# Patient Record
Sex: FEMALE | Race: WHITE | NOT HISPANIC OR LATINO | Employment: UNEMPLOYED | URBAN - METROPOLITAN AREA
[De-identification: names, ages, dates, MRNs, and addresses within clinical notes are randomized per-mention and may not be internally consistent; named-entity substitution may affect disease eponyms.]

---

## 2017-03-21 ENCOUNTER — HISTORICAL (OUTPATIENT)
Dept: FAMILY MEDICINE | Facility: CLINIC | Age: 42
End: 2017-03-21

## 2017-03-22 ENCOUNTER — HISTORICAL (OUTPATIENT)
Dept: ADMINISTRATIVE | Facility: HOSPITAL | Age: 42
End: 2017-03-22

## 2017-05-15 ENCOUNTER — HISTORICAL (OUTPATIENT)
Dept: FAMILY MEDICINE | Facility: CLINIC | Age: 42
End: 2017-05-15

## 2018-06-04 ENCOUNTER — HISTORICAL (OUTPATIENT)
Dept: RADIOLOGY | Facility: HOSPITAL | Age: 43
End: 2018-06-04

## 2018-10-01 ENCOUNTER — HISTORICAL (OUTPATIENT)
Dept: ADMINISTRATIVE | Facility: HOSPITAL | Age: 43
End: 2018-10-01

## 2018-10-03 LAB — FINAL CULTURE: NORMAL

## 2019-02-26 ENCOUNTER — HISTORICAL (OUTPATIENT)
Dept: RADIOLOGY | Facility: HOSPITAL | Age: 44
End: 2019-02-26

## 2019-04-01 ENCOUNTER — HISTORICAL (OUTPATIENT)
Dept: FAMILY MEDICINE | Facility: CLINIC | Age: 44
End: 2019-04-01

## 2019-04-01 LAB
BUN SERPL-MCNC: 10 MG/DL (ref 7–18)
CREAT SERPL-MCNC: 0.9 MG/DL (ref 0.6–1.3)

## 2019-04-18 ENCOUNTER — HISTORICAL (OUTPATIENT)
Dept: RADIOLOGY | Facility: HOSPITAL | Age: 44
End: 2019-04-18

## 2019-07-09 ENCOUNTER — HISTORICAL (OUTPATIENT)
Dept: RADIOLOGY | Facility: HOSPITAL | Age: 44
End: 2019-07-09

## 2019-07-18 ENCOUNTER — HISTORICAL (OUTPATIENT)
Dept: RADIOLOGY | Facility: HOSPITAL | Age: 44
End: 2019-07-18

## 2019-09-10 ENCOUNTER — HISTORICAL (OUTPATIENT)
Dept: RADIOLOGY | Facility: HOSPITAL | Age: 44
End: 2019-09-10

## 2019-10-16 ENCOUNTER — HISTORICAL (OUTPATIENT)
Dept: ENDOSCOPY | Facility: HOSPITAL | Age: 44
End: 2019-10-16

## 2020-01-08 ENCOUNTER — HISTORICAL (OUTPATIENT)
Dept: ADMINISTRATIVE | Facility: HOSPITAL | Age: 45
End: 2020-01-08

## 2020-01-08 LAB
ABS NEUT (OLG): 4.36 X10(3)/MCL (ref 2.1–9.2)
ALBUMIN SERPL-MCNC: 4.2 GM/DL (ref 3.4–5)
ALBUMIN/GLOB SERPL: 1 RATIO (ref 1.1–2)
ALP SERPL-CCNC: 78 UNIT/L (ref 45–117)
ALT SERPL-CCNC: 24 UNIT/L (ref 12–78)
ANISOCYTOSIS BLD QL SMEAR: NORMAL
AST SERPL-CCNC: 13 UNIT/L (ref 15–37)
BASOPHILS # BLD AUTO: 0.1 X10(3)/MCL (ref 0–0.2)
BASOPHILS NFR BLD AUTO: 1 %
BILIRUB SERPL-MCNC: 0.3 MG/DL (ref 0.2–1)
BILIRUBIN DIRECT+TOT PNL SERPL-MCNC: <0.1 MG/DL (ref 0–0.2)
BILIRUBIN DIRECT+TOT PNL SERPL-MCNC: ABNORMAL MG/DL
BUN SERPL-MCNC: 11 MG/DL (ref 7–18)
CALCIUM SERPL-MCNC: 9 MG/DL (ref 8.5–10.1)
CHLORIDE SERPL-SCNC: 108 MMOL/L (ref 98–107)
CO2 SERPL-SCNC: 22 MMOL/L (ref 21–32)
CREAT SERPL-MCNC: 0.9 MG/DL (ref 0.6–1.3)
DACRYOCYTES BLD QL SMEAR: NORMAL
EOSINOPHIL # BLD AUTO: 0.4 X10(3)/MCL (ref 0–0.9)
EOSINOPHIL NFR BLD AUTO: 5 %
ERYTHROCYTE [DISTWIDTH] IN BLOOD BY AUTOMATED COUNT: 18.6 % (ref 11.5–14.5)
GLOBULIN SER-MCNC: 4.4 GM/ML (ref 2.3–3.5)
GLUCOSE SERPL-MCNC: 94 MG/DL (ref 74–106)
HCT VFR BLD AUTO: 33.9 % (ref 35–46)
HGB BLD-MCNC: 9.4 GM/DL (ref 12–16)
HYPOCHROMIA BLD QL SMEAR: NORMAL
IMM GRANULOCYTES # BLD AUTO: 0.02 10*3/UL
IMM GRANULOCYTES NFR BLD AUTO: 0 %
LYMPHOCYTES # BLD AUTO: 1.8 X10(3)/MCL (ref 0.6–4.6)
LYMPHOCYTES NFR BLD AUTO: 26 %
MCH RBC QN AUTO: 20.6 PG (ref 26–34)
MCHC RBC AUTO-ENTMCNC: 27.7 GM/DL (ref 31–37)
MCV RBC AUTO: 74.2 FL (ref 80–100)
MICROCYTES BLD QL SMEAR: NORMAL
MONOCYTES # BLD AUTO: 0.4 X10(3)/MCL (ref 0.1–1.3)
MONOCYTES NFR BLD AUTO: 6 %
NEUTROPHILS # BLD AUTO: 4.36 X10(3)/MCL (ref 2.1–9.2)
NEUTROPHILS NFR BLD AUTO: 62 %
OVALOCYTES BLD QL SMEAR: NORMAL
PAP RECOMMENDATION EXT: NORMAL
PAP SMEAR: NORMAL
PLATELET # BLD AUTO: 361 X10(3)/MCL (ref 130–400)
PLATELET # BLD EST: ADEQUATE 10*3/UL
PMV BLD AUTO: 10.9 FL (ref 7.4–10.4)
POIKILOCYTOSIS BLD QL SMEAR: NORMAL
POLYCHROMASIA BLD QL SMEAR: NORMAL
POTASSIUM SERPL-SCNC: 3.8 MMOL/L (ref 3.5–5.1)
PROT SERPL-MCNC: 8.6 GM/DL (ref 6.4–8.2)
RBC # BLD AUTO: 4.57 X10(6)/MCL (ref 4–5.2)
RBC MORPH BLD: NORMAL
SODIUM SERPL-SCNC: 140 MMOL/L (ref 136–145)
TARGETS BLD QL SMEAR: NORMAL
TSH SERPL-ACNC: 2.22 MIU/L (ref 0.36–3.74)
WBC # SPEC AUTO: 7 X10(3)/MCL (ref 4.5–11)

## 2020-02-17 ENCOUNTER — HISTORICAL (OUTPATIENT)
Dept: INTERNAL MEDICINE | Facility: CLINIC | Age: 45
End: 2020-02-17

## 2020-02-17 LAB
ABS NEUT (OLG): 3.82 X10(3)/MCL (ref 2.1–9.2)
ALBUMIN SERPL-MCNC: 4.1 GM/DL (ref 3.4–5)
ALBUMIN/GLOB SERPL: 1 RATIO (ref 1.1–2)
ALP SERPL-CCNC: 63 UNIT/L (ref 45–117)
ALT SERPL-CCNC: 17 UNIT/L (ref 12–78)
AST SERPL-CCNC: 13 UNIT/L (ref 15–37)
BASOPHILS # BLD AUTO: 0 X10(3)/MCL (ref 0–0.2)
BASOPHILS NFR BLD AUTO: 1 %
BILIRUB SERPL-MCNC: 0.2 MG/DL (ref 0.2–1)
BILIRUBIN DIRECT+TOT PNL SERPL-MCNC: <0.1 MG/DL (ref 0–0.2)
BILIRUBIN DIRECT+TOT PNL SERPL-MCNC: ABNORMAL MG/DL
BUN SERPL-MCNC: 10 MG/DL (ref 7–18)
CALCIUM SERPL-MCNC: 9.3 MG/DL (ref 8.5–10.1)
CHLORIDE SERPL-SCNC: 110 MMOL/L (ref 98–107)
CO2 SERPL-SCNC: 24 MMOL/L (ref 21–32)
CREAT SERPL-MCNC: 0.9 MG/DL (ref 0.6–1.3)
EOSINOPHIL # BLD AUTO: 0.2 X10(3)/MCL (ref 0–0.9)
EOSINOPHIL NFR BLD AUTO: 4 %
ERYTHROCYTE [DISTWIDTH] IN BLOOD BY AUTOMATED COUNT: 19 % (ref 11.5–14.5)
GLOBULIN SER-MCNC: 4.2 GM/ML (ref 2.3–3.5)
GLUCOSE SERPL-MCNC: 84 MG/DL (ref 74–106)
HCT VFR BLD AUTO: 31.7 % (ref 35–46)
HGB BLD-MCNC: 9 GM/DL (ref 12–16)
IMM GRANULOCYTES # BLD AUTO: 0.02 10*3/UL
IMM GRANULOCYTES NFR BLD AUTO: 0 %
LYMPHOCYTES # BLD AUTO: 1.6 X10(3)/MCL (ref 0.6–4.6)
LYMPHOCYTES NFR BLD AUTO: 26 %
MCH RBC QN AUTO: 21.2 PG (ref 26–34)
MCHC RBC AUTO-ENTMCNC: 28.4 GM/DL (ref 31–37)
MCV RBC AUTO: 74.6 FL (ref 80–100)
MONOCYTES # BLD AUTO: 0.4 X10(3)/MCL (ref 0.1–1.3)
MONOCYTES NFR BLD AUTO: 7 %
NEUTROPHILS # BLD AUTO: 3.82 X10(3)/MCL (ref 2.1–9.2)
NEUTROPHILS NFR BLD AUTO: 62 %
PLATELET # BLD AUTO: 255 X10(3)/MCL (ref 130–400)
PMV BLD AUTO: 11.4 FL (ref 7.4–10.4)
POTASSIUM SERPL-SCNC: 3.4 MMOL/L (ref 3.5–5.1)
PROT SERPL-MCNC: 8.3 GM/DL (ref 6.4–8.2)
RBC # BLD AUTO: 4.25 X10(6)/MCL (ref 4–5.2)
SODIUM SERPL-SCNC: 139 MMOL/L (ref 136–145)
WBC # SPEC AUTO: 6.2 X10(3)/MCL (ref 4.5–11)

## 2020-10-16 ENCOUNTER — HISTORICAL (OUTPATIENT)
Dept: GASTROENTEROLOGY | Facility: CLINIC | Age: 45
End: 2020-10-16

## 2020-10-22 ENCOUNTER — HISTORICAL (OUTPATIENT)
Dept: ENDOSCOPY | Facility: HOSPITAL | Age: 45
End: 2020-10-22

## 2020-10-22 LAB — POC BETA-HCG (QUAL): NEGATIVE

## 2020-12-03 ENCOUNTER — HISTORICAL (OUTPATIENT)
Dept: SLEEP MEDICINE | Facility: HOSPITAL | Age: 45
End: 2020-12-03

## 2020-12-21 ENCOUNTER — HISTORICAL (OUTPATIENT)
Dept: RADIOLOGY | Facility: HOSPITAL | Age: 45
End: 2020-12-21

## 2021-01-15 ENCOUNTER — HISTORICAL (OUTPATIENT)
Dept: ADMINISTRATIVE | Facility: HOSPITAL | Age: 46
End: 2021-01-15

## 2021-01-15 LAB
ABS NEUT (OLG): 3.9 X10(3)/MCL (ref 2.1–9.2)
ALBUMIN SERPL-MCNC: 4.5 GM/DL (ref 3.5–5)
ALBUMIN/GLOB SERPL: 1.1 RATIO (ref 1.1–2)
ALP SERPL-CCNC: 61 UNIT/L (ref 40–150)
ALT SERPL-CCNC: 11 UNIT/L (ref 0–55)
AST SERPL-CCNC: 14 UNIT/L (ref 5–34)
BASOPHILS # BLD AUTO: 0 X10(3)/MCL (ref 0–0.2)
BASOPHILS NFR BLD AUTO: 1 %
BILIRUB SERPL-MCNC: 0.7 MG/DL
BILIRUBIN DIRECT+TOT PNL SERPL-MCNC: 0.3 MG/DL (ref 0–0.8)
BILIRUBIN DIRECT+TOT PNL SERPL-MCNC: 0.4 MG/DL (ref 0–0.5)
BUN SERPL-MCNC: 10 MG/DL (ref 7–18.7)
CALCIUM SERPL-MCNC: 9.5 MG/DL (ref 8.4–10.2)
CHLORIDE SERPL-SCNC: 107 MMOL/L (ref 98–107)
CHOLEST SERPL-MCNC: 149 MG/DL
CHOLEST/HDLC SERPL: 2 {RATIO} (ref 0–5)
CO2 SERPL-SCNC: 22 MMOL/L (ref 22–29)
CREAT SERPL-MCNC: 0.94 MG/DL (ref 0.55–1.02)
DEPRECATED CALCIDIOL+CALCIFEROL SERPL-MC: 40.7 NG/ML (ref 30–80)
EOSINOPHIL # BLD AUTO: 0 X10(3)/MCL (ref 0–0.9)
EOSINOPHIL NFR BLD AUTO: 0 %
ERYTHROCYTE [DISTWIDTH] IN BLOOD BY AUTOMATED COUNT: 17.4 % (ref 11.5–14.5)
EST CREAT CLEARANCE SER (OHS): 71.14 ML/MIN
EST. AVERAGE GLUCOSE BLD GHB EST-MCNC: 116.9 MG/DL
GLOBULIN SER-MCNC: 4.1 GM/DL (ref 2.4–3.5)
GLUCOSE SERPL-MCNC: 84 MG/DL (ref 74–100)
HBA1C MFR BLD: 5.7 %
HCT VFR BLD AUTO: 31.9 % (ref 35–46)
HDLC SERPL-MCNC: 62 MG/DL (ref 35–60)
HGB BLD-MCNC: 8.8 GM/DL (ref 12–16)
IMM GRANULOCYTES # BLD AUTO: 0.01 10*3/UL
IMM GRANULOCYTES NFR BLD AUTO: 0 %
LDLC SERPL CALC-MCNC: 68 MG/DL (ref 50–140)
LYMPHOCYTES # BLD AUTO: 1.5 X10(3)/MCL (ref 0.6–4.6)
LYMPHOCYTES NFR BLD AUTO: 26 %
MCH RBC QN AUTO: 20.1 PG (ref 26–34)
MCHC RBC AUTO-ENTMCNC: 27.6 GM/DL (ref 31–37)
MCV RBC AUTO: 73 FL (ref 80–100)
MONOCYTES # BLD AUTO: 0.3 X10(3)/MCL (ref 0.1–1.3)
MONOCYTES NFR BLD AUTO: 6 %
NEUTROPHILS # BLD AUTO: 3.9 X10(3)/MCL (ref 2.1–9.2)
NEUTROPHILS NFR BLD AUTO: 67 %
PLATELET # BLD AUTO: 310 X10(3)/MCL (ref 130–400)
PMV BLD AUTO: 11.3 FL (ref 7.4–10.4)
POTASSIUM SERPL-SCNC: 4 MMOL/L (ref 3.5–5.1)
PROT SERPL-MCNC: 8.6 GM/DL (ref 6.4–8.3)
RBC # BLD AUTO: 4.37 X10(6)/MCL (ref 4–5.2)
SODIUM SERPL-SCNC: 138 MMOL/L (ref 136–145)
TRIGL SERPL-MCNC: 95 MG/DL (ref 37–140)
TSH SERPL-ACNC: 2.03 UIU/ML (ref 0.35–4.94)
VIT B12 SERPL-MCNC: 448 PG/ML (ref 213–816)
VLDLC SERPL CALC-MCNC: 19 MG/DL
WBC # SPEC AUTO: 5.8 X10(3)/MCL (ref 4.5–11)

## 2021-02-22 LAB — POC BETA-HCG (QUAL): NEGATIVE

## 2021-05-21 LAB — POC BETA-HCG (QUAL): NEGATIVE

## 2021-08-04 ENCOUNTER — HISTORICAL (OUTPATIENT)
Dept: ADMINISTRATIVE | Facility: HOSPITAL | Age: 46
End: 2021-08-04

## 2021-08-04 LAB
FERRITIN SERPL-MCNC: 14.16 NG/ML (ref 4.63–204)
IRON SATN MFR SERPL: 27 % (ref 20–50)
IRON SATN MFR SERPL: 29 % (ref 20–50)
IRON SERPL-MCNC: 90 UG/DL (ref 50–170)
IRON SERPL-MCNC: 93 UG/DL (ref 50–170)
TIBC SERPL-MCNC: 227 UG/DL (ref 70–310)
TIBC SERPL-MCNC: 239 UG/DL (ref 70–310)
TIBC SERPL-MCNC: 320 UG/DL (ref 250–450)
TIBC SERPL-MCNC: 329 UG/DL (ref 250–450)
TRANSFERRIN SERPL-MCNC: 295 MG/DL (ref 180–382)
TRANSFERRIN SERPL-MCNC: 300 MG/DL (ref 180–382)

## 2021-12-21 ENCOUNTER — HISTORICAL (OUTPATIENT)
Dept: FAMILY MEDICINE | Facility: CLINIC | Age: 46
End: 2021-12-21

## 2021-12-21 LAB
ABS NEUT (OLG): 2.3 X10(3)/MCL (ref 2.1–9.2)
BASOPHILS # BLD AUTO: 0 X10(3)/MCL (ref 0–0.2)
BASOPHILS NFR BLD AUTO: 1 %
EOSINOPHIL # BLD AUTO: 0.1 X10(3)/MCL (ref 0–0.9)
EOSINOPHIL NFR BLD AUTO: 3 %
ERYTHROCYTE [DISTWIDTH] IN BLOOD BY AUTOMATED COUNT: 13.2 % (ref 11.5–14.5)
HCT VFR BLD AUTO: 41.8 % (ref 35–46)
HGB BLD-MCNC: 13.7 GM/DL (ref 12–16)
IMM GRANULOCYTES # BLD AUTO: 0.01 10*3/UL
IMM GRANULOCYTES NFR BLD AUTO: 0 %
LYMPHOCYTES # BLD AUTO: 1.8 X10(3)/MCL (ref 0.6–4.6)
LYMPHOCYTES NFR BLD AUTO: 39 %
MCH RBC QN AUTO: 29.6 PG (ref 26–34)
MCHC RBC AUTO-ENTMCNC: 32.8 GM/DL (ref 31–37)
MCV RBC AUTO: 90.3 FL (ref 80–100)
MONOCYTES # BLD AUTO: 0.3 X10(3)/MCL (ref 0.1–1.3)
MONOCYTES NFR BLD AUTO: 6 %
NEUTROPHILS # BLD AUTO: 2.3 X10(3)/MCL (ref 2.1–9.2)
NEUTROPHILS NFR BLD AUTO: 51 %
NRBC BLD AUTO-RTO: 0 % (ref 0–0.2)
PLATELET # BLD AUTO: 205 X10(3)/MCL (ref 130–400)
PMV BLD AUTO: 11.1 FL (ref 7.4–10.4)
RBC # BLD AUTO: 4.63 X10(6)/MCL (ref 4–5.2)
WBC # SPEC AUTO: 4.5 X10(3)/MCL (ref 4.5–11)

## 2022-03-18 ENCOUNTER — HISTORICAL (OUTPATIENT)
Dept: RADIOLOGY | Facility: HOSPITAL | Age: 47
End: 2022-03-18

## 2022-03-18 ENCOUNTER — HISTORICAL (OUTPATIENT)
Dept: ADMINISTRATIVE | Facility: HOSPITAL | Age: 47
End: 2022-03-18

## 2022-04-10 ENCOUNTER — HISTORICAL (OUTPATIENT)
Dept: ADMINISTRATIVE | Facility: HOSPITAL | Age: 47
End: 2022-04-10
Payer: MEDICARE

## 2022-04-25 VITALS
SYSTOLIC BLOOD PRESSURE: 129 MMHG | WEIGHT: 184.06 LBS | HEIGHT: 66 IN | OXYGEN SATURATION: 100 % | BODY MASS INDEX: 29.58 KG/M2 | DIASTOLIC BLOOD PRESSURE: 83 MMHG

## 2022-05-12 DIAGNOSIS — E04.1 THYROID NODULE: Primary | ICD-10-CM

## 2022-05-23 ENCOUNTER — TELEPHONE (OUTPATIENT)
Dept: FAMILY MEDICINE | Facility: CLINIC | Age: 47
End: 2022-05-23
Payer: MEDICARE

## 2022-05-23 DIAGNOSIS — G89.29 CHRONIC NONINTRACTABLE HEADACHE, UNSPECIFIED HEADACHE TYPE: ICD-10-CM

## 2022-05-23 DIAGNOSIS — T78.40XA ALLERGY, INITIAL ENCOUNTER: Primary | ICD-10-CM

## 2022-05-23 DIAGNOSIS — R51.9 CHRONIC NONINTRACTABLE HEADACHE, UNSPECIFIED HEADACHE TYPE: ICD-10-CM

## 2022-05-23 DIAGNOSIS — G43.711 CHRONIC MIGRAINE WITHOUT AURA, INTRACTABLE, WITH STATUS MIGRAINOSUS: ICD-10-CM

## 2022-05-23 RX ORDER — TOPIRAMATE 100 MG/1
100 TABLET, FILM COATED ORAL 2 TIMES DAILY
Qty: 60 TABLET | Refills: 3 | Status: SHIPPED | OUTPATIENT
Start: 2022-05-23 | End: 2022-06-22

## 2022-05-23 RX ORDER — CETIRIZINE HYDROCHLORIDE 10 MG/1
10 TABLET ORAL DAILY
Qty: 30 TABLET | Refills: 11 | Status: SHIPPED | OUTPATIENT
Start: 2022-05-23 | End: 2022-06-17 | Stop reason: SDUPTHER

## 2022-05-23 RX ORDER — MONTELUKAST SODIUM 10 MG/1
10 TABLET ORAL NIGHTLY
Qty: 30 TABLET | Refills: 3 | Status: SHIPPED | OUTPATIENT
Start: 2022-05-23 | End: 2022-06-17 | Stop reason: SDUPTHER

## 2022-05-23 RX ORDER — GABAPENTIN 300 MG/1
300 CAPSULE ORAL 2 TIMES DAILY
Qty: 60 CAPSULE | Refills: 3 | Status: SHIPPED | OUTPATIENT
Start: 2022-05-23 | End: 2022-06-17 | Stop reason: SDUPTHER

## 2022-05-23 NOTE — TELEPHONE ENCOUNTER
----- Message from Aaliyah Epps MA sent at 5/23/2022  9:22 AM CDT -----  Regarding: refills  R/S her appointment today. She needs refills on :  Ceterizine  Montelukast  Toprimate  Gabpentin  Pharmacy [facility]

## 2022-05-24 ENCOUNTER — HOSPITAL ENCOUNTER (OUTPATIENT)
Dept: RADIOLOGY | Facility: HOSPITAL | Age: 47
Discharge: HOME | End: 2022-05-24
Attending: STUDENT IN AN ORGANIZED HEALTH CARE EDUCATION/TRAINING PROGRAM
Payer: MEDICAID

## 2022-05-24 DIAGNOSIS — E04.1 THYROID NODULE: ICD-10-CM

## 2022-05-24 PROCEDURE — 76536 US EXAM OF HEAD AND NECK: CPT | Mod: TC

## 2022-05-25 ENCOUNTER — TELEPHONE (OUTPATIENT)
Dept: FAMILY MEDICINE | Facility: CLINIC | Age: 47
End: 2022-05-25
Payer: MEDICAID

## 2022-05-25 DIAGNOSIS — E04.2 MULTIPLE THYROID NODULES: Primary | ICD-10-CM

## 2022-05-25 NOTE — TELEPHONE ENCOUNTER
Called the patient today at 11:52 AM regarding her thyroid ultrasound results.  After confirming the date of birth I released the results.  Even though the ultrasound report says multiple subcentimeter nodules in the right side did not meet the criteria for biopsy but will refer the patient to ENT for further recommendations.  Patient is currently asymptomatic.  Patient voiced understanding.  Referral done.        Dianne JOHN [facility]

## 2022-06-17 ENCOUNTER — OFFICE VISIT (OUTPATIENT)
Dept: FAMILY MEDICINE | Facility: CLINIC | Age: 47
End: 2022-06-17
Payer: MEDICAID

## 2022-06-17 VITALS
BODY MASS INDEX: 31.55 KG/M2 | WEIGHT: 189.38 LBS | HEIGHT: 65 IN | SYSTOLIC BLOOD PRESSURE: 131 MMHG | RESPIRATION RATE: 18 BRPM | OXYGEN SATURATION: 100 % | TEMPERATURE: 98 F | HEART RATE: 80 BPM | DIASTOLIC BLOOD PRESSURE: 85 MMHG

## 2022-06-17 DIAGNOSIS — R51.9 CHRONIC DAILY HEADACHE: Primary | ICD-10-CM

## 2022-06-17 DIAGNOSIS — E04.1 THYROID NODULE: ICD-10-CM

## 2022-06-17 DIAGNOSIS — F32.A DEPRESSION, UNSPECIFIED DEPRESSION TYPE: ICD-10-CM

## 2022-06-17 PROCEDURE — 99213 OFFICE O/P EST LOW 20 MIN: CPT | Mod: PBBFAC

## 2022-06-17 RX ORDER — CETIRIZINE HYDROCHLORIDE 10 MG/1
10 TABLET ORAL DAILY
Qty: 30 TABLET | Refills: 11 | Status: SHIPPED | OUTPATIENT
Start: 2022-06-17 | End: 2022-11-29 | Stop reason: SDUPTHER

## 2022-06-17 RX ORDER — MONTELUKAST SODIUM 10 MG/1
10 TABLET ORAL NIGHTLY
Qty: 30 TABLET | Refills: 3 | Status: SHIPPED | OUTPATIENT
Start: 2022-06-17 | End: 2022-07-17

## 2022-06-17 RX ORDER — GABAPENTIN 300 MG/1
300 CAPSULE ORAL 2 TIMES DAILY
Qty: 60 CAPSULE | Refills: 0 | Status: SHIPPED | OUTPATIENT
Start: 2022-06-17 | End: 2022-06-24

## 2022-06-17 NOTE — PROGRESS NOTES
Chief Complaint  chronic headache  and Medication Refill      History of Present Illness  Hawa Ortiz is a 47 y.o. year old female presents for the following    Acute issues  None.  Patient reports that she has been feeling great and has no concerns.    Chronic Issues  Depression- She is currently following [facility]. Currently on Latuda 40 mg every day. Denies of any suicidal or homicidal symptoms. PHQ-9 score- 6    Chronic daily headaches and migraine - well controlled, following neurology at [facility]. Currently on TPM  and Neurontin     History of bilateral cystic thyroid nodule-asymptomatic. Denies of any dysphagia or hoarseness of voice. Ultrasound in September 2019 which demonstrated stable bilateral cystic thyroid foci with no developmental of solid or concerning nodule. Last TSH wnl. Most recent US was on 5/12/22-  Some increase in size of a cyst in the left hemithyroid with some solid components within.Multiple subcentimeter nodules in the right do not meet criteria for biopsy. ENT referral done, has an appointments on 6/21/22.      Health Management  pap smear- 2020 NIL  LMP- 4/11 , on Depo  Screening colonoscopy- with Dr. Dunham in July/2022    Review of Systems  -fever, -chills, -fatigue  -chest pain, -SOB  -n/v/d/c, -abdominal pain    Physical Exam  General: Patient is sitting in the chair, not in acute distress  HEENT- mild thyromegaly left side, non tender  CVS: RRR, no MRG, no peripheral edema  Chest: CTA bilaterally  Abdomen: soft, nontender, normal bowel sounds heard  Psych: calm and cooperative    Vitals:    06/17/22 0951   BP: 131/85   Pulse: 80   Resp: 18   Temp: 98.4 °F (36.9 °C)     Wt Readings from Last 2 Encounters:   06/17/22 85.9 kg (189 lb 6 oz)   01/21/22 83.5 kg (184 lb 1.4 oz)         Assessment / Plan:  Chronic daily headache  Recommend to continue Neurontin and Topamax as prescribed by Neurology  Recommend to keep scheduled appointment with Neurology in  October    Depression, unspecified depression type  Well controlled with Latuda  Keep scheduled appointment with [facility]    Thyroid nodule  Last TSH WNL  Keep scheduled appointment with ENT   Follow-up in 3 months with colonoscopy results     Other orders  -     cetirizine (ZYRTEC) 10 MG tablet; Take 1 tablet (10 mg total) by mouth once daily.  Dispense: 30 tablet; Refill: 11  -     gabapentin (NEURONTIN) 300 MG capsule; Take 1 capsule (300 mg total) by mouth 2 (two) times daily.  Dispense: 60 capsule; Refill: 0  -     montelukast (SINGULAIR) 10 mg tablet; Take 1 tablet (10 mg total) by mouth every evening.  Dispense: 30 tablet; Refill: 3     Dianne Larios MD  [facility]    Portions of the record may have been created with voice recognition software. Occasional wrong-word or sound-a-like substitutions may have occurred due to the inherent limitations of voice recognition software. Read the chart carefully and recognize, using context, where substitutions have occurred.

## 2022-06-21 ENCOUNTER — OFFICE VISIT (OUTPATIENT)
Dept: OTOLARYNGOLOGY | Facility: CLINIC | Age: 47
End: 2022-06-21
Payer: MEDICAID

## 2022-06-21 VITALS
HEIGHT: 66 IN | DIASTOLIC BLOOD PRESSURE: 80 MMHG | SYSTOLIC BLOOD PRESSURE: 114 MMHG | HEART RATE: 72 BPM | TEMPERATURE: 98 F | BODY MASS INDEX: 29.76 KG/M2 | WEIGHT: 185.19 LBS

## 2022-06-21 DIAGNOSIS — J30.9 ALLERGIC RHINITIS, UNSPECIFIED SEASONALITY, UNSPECIFIED TRIGGER: ICD-10-CM

## 2022-06-21 DIAGNOSIS — R13.10 DYSPHAGIA, UNSPECIFIED TYPE: Primary | ICD-10-CM

## 2022-06-21 DIAGNOSIS — K21.9 LPRD (LARYNGOPHARYNGEAL REFLUX DISEASE): ICD-10-CM

## 2022-06-21 PROCEDURE — 99213 OFFICE O/P EST LOW 20 MIN: CPT | Mod: PBBFAC

## 2022-06-21 PROCEDURE — 31575 DIAGNOSTIC LARYNGOSCOPY: CPT | Mod: PBBFAC | Performed by: STUDENT IN AN ORGANIZED HEALTH CARE EDUCATION/TRAINING PROGRAM

## 2022-06-21 RX ORDER — AZELASTINE 1 MG/ML
1 SPRAY, METERED NASAL 2 TIMES DAILY
Qty: 30 ML | Refills: 5 | Status: SHIPPED | OUTPATIENT
Start: 2022-06-21 | End: 2022-11-29 | Stop reason: SDUPTHER

## 2022-06-21 RX ORDER — LIDOCAINE HYDROCHLORIDE 40 MG/ML
2 INJECTION, SOLUTION RETROBULBAR
Status: DISCONTINUED | OUTPATIENT
Start: 2022-06-21 | End: 2022-11-17

## 2022-06-21 RX ORDER — FLUTICASONE PROPIONATE 50 MCG
1 SPRAY, SUSPENSION (ML) NASAL 2 TIMES DAILY
Qty: 15.8 ML | Refills: 12 | Status: SHIPPED | OUTPATIENT
Start: 2022-06-21 | End: 2022-11-29 | Stop reason: SDUPTHER

## 2022-06-21 NOTE — PROGRESS NOTES
[facility]  Otolaryngology Clinic Note    Hawa Ortiz  Encounter Date: 6/21/2022  YOB: 1975  Physician: Walter Aguirre MD    Chief Complaint: dysphagia    HPI: Hawa Ortiz is a 47 y.o. female sent by her primary care physician for persistent dysphagia, sore throat particularly to water, difficulty with swallowing meats, intermittent hoarseness, postnasal drip, itchy eyes and nose, anterior rhinorrhea, sneezing, anterior neck discomfort.  She reports this has been as long as she can recall.  She had a thyroid ultrasound which had demonstrated thyroid nodules not meeting FNA criteria.  Her throat discomfort and nonproductive cough are worse at night prior to going to bed.  She frequently feels like solids gets stuck in her throat and require great effort to push down.    ROS:   General: Negative except per HPI  Skin: Denies rash, ulcer, or lesion.  Eyes: Denies vision changes or diplopia.  Ears: Negative except per HPI  Nose: Negative except per HPI  Throat/mouth: Negative except per HPI  Cardiovascular: Negative except per HPI  Respiratory: Negative except per HPI  Neck: Negative except per HPI  Endocrine: Negative except per HPI  Neurologic: Negative except per HPI    Other 10-point review of systems negative except per HPI      Review of patient's allergies indicates:  No Known Allergies    History reviewed. No pertinent past medical history.    Past Surgical History:   Procedure Laterality Date    CHOLECYSTECTOMY      EYE SURGERY Bilateral        Social History     Socioeconomic History    Marital status: Single   Tobacco Use    Smoking status: Never Smoker    Smokeless tobacco: Never Used   Substance and Sexual Activity    Alcohol use: Never    Drug use: Never    Sexual activity: Not Currently       History reviewed. No pertinent family history.    Outpatient Encounter Medications as of 6/21/2022   Medication Sig Dispense Refill    cetirizine (ZYRTEC) 10 MG  "tablet Take 1 tablet (10 mg total) by mouth once daily. 30 tablet 11    gabapentin (NEURONTIN) 300 MG capsule Take 1 capsule (300 mg total) by mouth 2 (two) times daily. 60 capsule 0    montelukast (SINGULAIR) 10 mg tablet Take 1 tablet (10 mg total) by mouth every evening. 30 tablet 3    topiramate (TOPAMAX) 100 MG tablet TAKE 1 TABLET BY MOUTH TWICE A  tablet 1    topiramate (TOPAMAX) 100 MG tablet Take 1 tablet (100 mg total) by mouth 2 (two) times daily. 60 tablet 3     No facility-administered encounter medications on file as of 6/21/2022.       Physical Exam:  Vitals:    06/21/22 1417   BP: 114/80   BP Location: Right arm   Patient Position: Sitting   Pulse: 72   Temp: 98.2 °F (36.8 °C)   TempSrc: Oral   Weight: 84 kg (185 lb 3.2 oz)   Height: 5' 6" (1.676 m)       Constitutional  General Appearance: well nourished, well-developed, AAO x3, NAD  HEENT  Eyes: PEERLA, EOMI, normal conjunctivae  Ears: Hearing well at conversation level   AD: auricle normal, EAC normal, TM intact, no RAFA   AS: auricle normal, EAC normal, TM intact, no RAFA   Vestibular: ambulates without gait disturbance  Nose: septum midline, no inferior turbinate hypertrophy, no polyps, moist mucosa without erythema or blue hue  OC/OP: dentition poor, no oral lesions, tongue/FOM/BOT- soft, no leukoplakia/ulcerations/ tenderness  Nasopharynx, Hypopharynx, and Larynx:    Indirect: attempted, limited view due to patient intolerance  Neck: soft, non-tender, no palpable lymph nodes   Thyroid region- no nodules or goiter  Neuro: CN II - XII intact bilaterally  Cardiovascular: peripheral pulses palpable  Respiratory: non-labored respirations  Psychiatric: oriented to time, place and person, no depression, anxiety or agitation    Procedures:Flexible Fiberoptic Laryngoscopy/Nasopharyngoscopy via left nare    Procedure in Detail: Informed consent was obtained from the patient after explanation of procedure, indications, risks and benefits. " Flexible endoscopy was performed through the nasal passages. The nasal cavity, nasopharynx, oropharynx, hypopharynx and larynx were adequately visualized. The true vocal cords and arytenoids were examined during phonation and repose.    Anesthesia: Topical Neosynephrine / Tetracaine  Adverse Events: None  Resident Surgeon: Walter Aguirre MD   Blood loss: none  Condition: good    Findings:  NP/OP: no masses/lesions of NC, eustachian tube, fossa of Rosenmuller, no adenoid hypertrophy  BOT/vallecula: no lingual hypertrophy, no masses/lesions, no secretions obscuring visualization  Piriform sinuses/post-cricoid: no masses/lesions, no pooling of secretions  Epiglottis: lingual and laryngeal surfaces within normal limits  Arytenoids/FVFs: no masses/lesions, no edema, bilateral mobility  TVCs: bilateral cord mobility, no masses or lesions  No aspiration, no pooled secretions  No sign of malignancy      Pertinent Data:  ? LABS:  ? AUDIO:  ? CT Scan:    Imaging:   I personally reviewed the following images:    Summary of Outside Records:      Assessment/Plan:  Hawa Ortiz is a 47 y.o. female with AR, likely LPRD/GERD, solid dysphagia, differential including eosinophilic esophagitis. Has subcentimeter right thyroid nodules, nearly 2 cm cystic nodule of left thyroid    - Allergy test  - Saline, Flonase, Astelin  - Referal to GI for solid dysphagia (mostly meat)  - Observe thyroid cystic nodule for now, repeat thyroid US in 6 months  - RTC 6 weeks    Walter Aguirre MD  [facility] Department of Otolaryngology  [facility]

## 2022-08-25 ENCOUNTER — OFFICE VISIT (OUTPATIENT)
Dept: OTOLARYNGOLOGY | Facility: CLINIC | Age: 47
End: 2022-08-25
Payer: MEDICAID

## 2022-08-25 VITALS
BODY MASS INDEX: 29.57 KG/M2 | DIASTOLIC BLOOD PRESSURE: 71 MMHG | RESPIRATION RATE: 16 BRPM | WEIGHT: 184 LBS | TEMPERATURE: 98 F | HEIGHT: 66 IN | SYSTOLIC BLOOD PRESSURE: 105 MMHG | HEART RATE: 65 BPM

## 2022-08-25 DIAGNOSIS — K21.9 LPRD (LARYNGOPHARYNGEAL REFLUX DISEASE): Primary | ICD-10-CM

## 2022-08-25 PROCEDURE — 99213 OFFICE O/P EST LOW 20 MIN: CPT | Mod: PBBFAC

## 2022-08-25 RX ORDER — FAMOTIDINE 20 MG/1
20 TABLET, FILM COATED ORAL DAILY PRN
Qty: 30 TABLET | Refills: 6 | Status: SHIPPED | OUTPATIENT
Start: 2022-08-25 | End: 2023-08-15 | Stop reason: SDUPTHER

## 2022-08-25 RX ORDER — PANTOPRAZOLE SODIUM 40 MG/1
40 TABLET, DELAYED RELEASE ORAL DAILY
Qty: 30 TABLET | Refills: 11 | Status: SHIPPED | OUTPATIENT
Start: 2022-08-25 | End: 2022-11-29 | Stop reason: SDUPTHER

## 2022-08-25 NOTE — PROGRESS NOTES
The scope used for the exam was:  Flexible scope ENF-P4  Serial Number:  1)    [id number]    []   2)    [id number]    []   3)    [id number]    []   4)    [id number]    []   5)    [id number]    []   6)    [id number]    []       The scope used for the exam was:  Rigid scope   Serial Number:  1)   [id number]    []   2)   [id number]    []   3)   7330    []   4)    3384   []   5)    0824   []   6)    5554   []     7)   7425    []   8)   2240    []   9)   1109    []

## 2022-08-25 NOTE — PROGRESS NOTES
[facility]  Otolaryngology Clinic Note    Hawa Ortiz  Encounter Date: 8/25/2022  YOB: 1975  Physician: Walter Aguirre MD    Chief Complaint: dysphagia    HPI: Hawa Ortiz is a 47 y.o. female sent by her primary care physician for persistent dysphagia, sore throat particularly to water, difficulty with swallowing meats, intermittent hoarseness, postnasal drip, itchy eyes and nose, anterior rhinorrhea, sneezing, anterior neck discomfort.  She reports this has been as long as she can recall.  She had a thyroid ultrasound which had demonstrated thyroid nodules not meeting FNA criteria.  Her throat discomfort and nonproductive cough are worse at night prior to going to bed.  She frequently feels like solids gets stuck in her throat and require great effort to push down.    8/25/22: Here today for follow up. Continues to endorse throat pain that is worse at night.  She states she does have reflux symptoms including burning chest pain and acid taste in her mouth. Occasionally loses her voice.  She does not take any medication for this. We discussed foods to avoid however she states where she lives, other people buy the food and she has no control over it. Unfortunately she eats a lot of friend chicken and pizza.  Has been using flonase and astelin but does not feel this has helped much with her nasal symptoms.     ROS:   General: Negative except per HPI  Skin: Denies rash, ulcer, or lesion.  Eyes: Denies vision changes or diplopia.  Ears: Negative except per HPI  Nose: Negative except per HPI  Throat/mouth: Negative except per HPI  Cardiovascular: Negative except per HPI  Respiratory: Negative except per HPI  Neck: Negative except per HPI  Endocrine: Negative except per HPI  Neurologic: Negative except per HPI    Other 10-point review of systems negative except per HPI      Review of patient's allergies indicates:  No Known Allergies    History reviewed. No pertinent past  "medical history.    Past Surgical History:   Procedure Laterality Date    CHOLECYSTECTOMY      EYE SURGERY Bilateral        Social History     Socioeconomic History    Marital status: Single   Tobacco Use    Smoking status: Never Smoker    Smokeless tobacco: Never Used   Substance and Sexual Activity    Alcohol use: Never    Drug use: Never    Sexual activity: Not Currently       History reviewed. No pertinent family history.    Outpatient Encounter Medications as of 8/25/2022   Medication Sig Dispense Refill    azelastine (ASTELIN) 137 mcg (0.1 %) nasal spray 1 spray (137 mcg total) by Nasal route 2 (two) times daily. 30 mL 5    fluticasone propionate (FLONASE) 50 mcg/actuation nasal spray 1 spray (50 mcg total) by Each Nostril route 2 (two) times a day. 15.8 mL 12    gabapentin (NEURONTIN) 300 MG capsule TAKE 1 CAPSULE Y MOUTH AT BEDTIME FOR 30 DAYS 30 capsule 4    sodium chloride (SALINE MIST) 0.65 % nasal spray 1 spray by Nasal route 2 (two) times a day. 15 mL 12    topiramate (TOPAMAX) 100 MG tablet TAKE 1 TABLET BY MOUTH TWICE A  tablet 1    cetirizine (ZYRTEC) 10 MG tablet Take 1 tablet (10 mg total) by mouth once daily. 30 tablet 11     Facility-Administered Encounter Medications as of 8/25/2022   Medication Dose Route Frequency Provider Last Rate Last Admin    LIDOcaine (PF) 40 mg/mL (4 %) injection 2 mL  2 mL Other 1 time in Clinic/HOD Walter Aguirre MD        phenylephrine HCL 1% nasal spray 1 spray  1 spray Each Nostril 1 time in Clinic/HOD Walter Aguirre MD           Physical Exam:  Vitals:    08/25/22 1223   BP: 105/71   BP Location: Left arm   Patient Position: Sitting   BP Method: Medium (Automatic)   Pulse: 65   Resp: 16   Temp: 98 °F (36.7 °C)   TempSrc: Oral   Weight: 83.5 kg (184 lb)   Height: 5' 6" (1.676 m)       Constitutional  General Appearance: well nourished, well-developed, AAO x3, NAD  HEENT  Eyes: PEERLA, EOMI, normal conjunctivae  Ears: Hearing well at " conversation level   AD: auricle normal, EAC normal, TM intact, no RAFA   AS: auricle normal, EAC normal, TM intact, no RAFA   Vestibular: ambulates without gait disturbance  Nose: septum midline, no inferior turbinate hypertrophy, no polyps, moist mucosa  OC/OP: dentition poor, no oral lesions, tongue/FOM/BOT- soft, no leukoplakia/ulcerations/ tenderness  Neck: soft, non-tender, no palpable lymph nodes   Thyroid region- no nodules or goiter  Neuro: CN II - XII intact bilaterally  Cardiovascular: peripheral pulses palpable  Respiratory: non-labored respirations  Psychiatric: oriented to time, place and person, no depression, anxiety or agitation    Pertinent Data:  ? LABS:  ? AUDIO:  ? CT Scan:    Imaging:   I personally reviewed the following images:    Summary of Outside Records:      Assessment/Plan:  Hawa Ortiz is a 47 y.o. female with LPRD/GERD, allergic rhinitis. Has subcentimeter right thyroid nodules, nearly 2 cm cystic nodule of left thyroid.    - RAST testing ordered at last visit, has not been completed   - Continue flonase and astelin. Went over correct way to administer medications.   - Will send script for protonix to take every morning. Since she is unable to control the foods she gets where she lives, I will also send a script for pepcid to be used as needed at night if she has worsening of symptoms.   - Observe thyroid cystic nodule for now, repeat thyroid US in 6 months  - RTC  3 months

## 2022-09-07 NOTE — PROGRESS NOTES
I have reviewed the notes, assessments, and/or procedures performed by resident, I concur with her/his documentation of Hawa Ortiz.

## 2022-09-13 ENCOUNTER — OFFICE VISIT (OUTPATIENT)
Dept: FAMILY MEDICINE | Facility: CLINIC | Age: 47
End: 2022-09-13
Payer: MEDICAID

## 2022-09-13 VITALS
DIASTOLIC BLOOD PRESSURE: 63 MMHG | WEIGHT: 185.81 LBS | SYSTOLIC BLOOD PRESSURE: 112 MMHG | HEIGHT: 66 IN | OXYGEN SATURATION: 100 % | HEART RATE: 61 BPM | BODY MASS INDEX: 29.86 KG/M2 | RESPIRATION RATE: 18 BRPM

## 2022-09-13 DIAGNOSIS — Z13.220 LIPID SCREENING: ICD-10-CM

## 2022-09-13 DIAGNOSIS — F32.A DEPRESSION, UNSPECIFIED DEPRESSION TYPE: Primary | ICD-10-CM

## 2022-09-13 DIAGNOSIS — Z11.4 ENCOUNTER FOR SCREENING FOR HIV: ICD-10-CM

## 2022-09-13 DIAGNOSIS — M54.50 LUMBAR PAIN: ICD-10-CM

## 2022-09-13 DIAGNOSIS — M54.10 RADICULOPATHY, UNSPECIFIED SPINAL REGION: ICD-10-CM

## 2022-09-13 DIAGNOSIS — Z11.59 ENCOUNTER FOR HEPATITIS C SCREENING TEST FOR LOW RISK PATIENT: ICD-10-CM

## 2022-09-13 DIAGNOSIS — Z13.1 DIABETES MELLITUS SCREENING: ICD-10-CM

## 2022-09-13 PROCEDURE — 99214 OFFICE O/P EST MOD 30 MIN: CPT | Mod: PBBFAC | Performed by: NURSE PRACTITIONER

## 2022-09-13 RX ORDER — METHOCARBAMOL 750 MG/1
750 TABLET, FILM COATED ORAL NIGHTLY PRN
Qty: 7 TABLET | Refills: 0 | Status: SHIPPED | OUTPATIENT
Start: 2022-09-13 | End: 2022-09-20

## 2022-09-13 RX ORDER — ESCITALOPRAM OXALATE 10 MG/1
10 TABLET ORAL DAILY
Qty: 30 TABLET | Refills: 1 | Status: SHIPPED | OUTPATIENT
Start: 2022-09-13 | End: 2022-10-17

## 2022-09-13 RX ORDER — TRAMADOL HYDROCHLORIDE 50 MG/1
50 TABLET ORAL EVERY 8 HOURS PRN
Qty: 15 TABLET | Refills: 0 | Status: CANCELLED | OUTPATIENT
Start: 2022-09-13 | End: 2022-09-18

## 2022-09-13 NOTE — PROGRESS NOTES
"  Family Medicine Clinic Note:    PCP: Archie Chen MD    Hawa Ortiz is a 47 y.o. female presents to clinic today for cc: routine visit      Subjective:     Acute:  -Right lower back pain   Onset 2 weeks ago  Hx of disc injury  with shooting pain in the back and legs  +sharp pain to right leg, night pain waking her up,   Denies saddle anesthesia, incontinence of urine or stool, no unintentional weight loss, trauma or injury     No MRI done  Did PT "years ago"  Has taken tylenol with some relief       Chronic:  -Depression  Followed [facility].  On Latuda 40 mg daily.   No SI/HI  +crying episodes daily, loss of interest, difficulty concentrating, decreaseed appetitie, dificulty getting out of the bed  Denies feelings of guilt, difficulty getting out of bed        HM:  Needs Hepatitis C Screen   Last Cervical Cancer Screen 2020 NIL  Last Colorectal screen-with Dr. Dunham in July/2022  Needs Influenza vaccine  Mammogram 3/18/2023 due  Tetanus Vaccine 1/8/2030 due      ROS  Constitutional: no fevers, no chills, no fatigue  ENMT: no ear pain or discharge, no change in hearing, no sinus problems, no mouth dryness or excessive drooling, no hoarseness, no change in speech   Respiratory: no trouble breathing, no SOB, no cough, no wheezing  Cardiovascular: no chest pain, no edema, no palpations, no dizziness  Gastrointestinal: no C/D/N/V      Current Outpatient Medications   Medication Sig Dispense Refill    azelastine (ASTELIN) 137 mcg (0.1 %) nasal spray 1 spray (137 mcg total) by Nasal route 2 (two) times daily. 30 mL 5    cetirizine (ZYRTEC) 10 MG tablet Take 1 tablet (10 mg total) by mouth once daily. 30 tablet 11    famotidine (PEPCID) 20 MG tablet Take 1 tablet (20 mg total) by mouth daily as needed for Heartburn. 30 tablet 6    fluticasone propionate (FLONASE) 50 mcg/actuation nasal spray 1 spray (50 mcg total) by Each Nostril route 2 (two) times a day. 15.8 mL 12    gabapentin (NEURONTIN) 300 MG " capsule TAKE 1 CAPSULE Y MOUTH AT BEDTIME FOR 30 DAYS 30 capsule 4    pantoprazole (PROTONIX) 40 MG tablet Take 1 tablet (40 mg total) by mouth once daily. 30 tablet 11    sodium chloride (SALINE MIST) 0.65 % nasal spray 1 spray by Nasal route 2 (two) times a day. 15 mL 12    topiramate (TOPAMAX) 100 MG tablet TAKE 1 TABLET BY MOUTH TWICE A  tablet 1     Current Facility-Administered Medications   Medication Dose Route Frequency Provider Last Rate Last Admin    LIDOcaine (PF) 40 mg/mL (4 %) injection 2 mL  2 mL Other 1 time in Clinic/HOD Walter Aguirre MD        phenylephrine HCL 1% nasal spray 1 spray  1 spray Each Nostril 1 time in Clinic/HOD Walter Aguirre MD           Objective:     /63   Pulse 61   Resp 18   Wt 84.3 kg (185 lb 12.8 oz)   SpO2 100%   BMI 29.99 kg/m²   BP Readings from Last 3 Encounters:   09/13/22 112/63   08/25/22 105/71   06/21/22 114/80     Wt Readings from Last 3 Encounters:   09/13/22 84.3 kg (185 lb 12.8 oz)   08/25/22 83.5 kg (184 lb)   06/21/22 84 kg (185 lb 3.2 oz)     No results found for this or any previous visit (from the past 200 hour(s)).  Body mass index is 29.99 kg/m².    Physical Exam  Constitutional: NAD  ENT: No lymphadenopathy, No thyromegaly  Lungs: CTAB, No crackles, No wheezes  Cardiovascular: Normal heart sounds, No MRG  Abdomen: Soft, Nontender, No palpable hepatosplenomegaly, No abdominal masses, No ascites  MSK: No cervical spine, lumbar or thoracic tenderness. Tenderness to right paraspinal at L4-L5. Limited ROM with turning at the waist, flexion 10 degrees and extension 5 degrees. +SLR on Right only to mid thigh. Able to plantar and dorsiflex both feet. Sensation intact BLE and Upper extremities. Able to ambulate without difficulty. CN II-XII intact. No noted weakness to upper and lower extremities. Negative Mayra and FADER. No proximal muscle weakness noted.       The 10-year ASCVD risk score (Judie MATTHEWS, et al., 2019) is: 0.4%    Values used  to calculate the score:      Age: 47 years      Sex: Female      Is Non- : No      Diabetic: No      Tobacco smoker: No      Systolic Blood Pressure: 112 mmHg      Is BP treated: No      HDL Cholesterol: 62 mg/dL      Total Cholesterol: 149 mg/dL      Assessment:   Hawa Ortiz is a 47 y.o. female with:    1. Depression, unspecified depression type    2. Encounter for hepatitis C screening test for low risk patient    3. Encounter for screening for HIV    4. Lipid screening    5. Diabetes mellitus screening    6. Lumbar pain    7. Radiculopathy, unspecified spinal region        Plan:   Depression  Keep next appointment with [facility]  Continue Latuda 40 mg daily  No recent labs on file.    Ordered CBC, CMP, Lipid panel, A1C,  Ordered lexapro 10 mg daily    Radicular Pain 2/2 Lumbar pain  Ordered robaxin 750 mg qhs. Counseled on ADEs.  Ordered X-ray AP/lateral, x-ray lumbar 5 views, x-ray lumbar flexion and extension     HM:  Ordered hepatitis screen  Ordered HIV screen      RTC in 3 months with PCP            Follow up in about 3 months (around 12/13/2022).    Future Appointments   Date Time Provider Department Center   10/13/2022  1:00 PM CINDY Haque [facility] NEURO Serg    11/29/2022 12:30 PM RESIDENT 2, [facility] OTORHINOLARYNGOLOGY [facility] ENT [facility]       Staff: Dr. Yolette Weldon MD  Family Medicine PGY-2                   today

## 2022-09-14 ENCOUNTER — HOSPITAL ENCOUNTER (OUTPATIENT)
Dept: RADIOLOGY | Facility: HOSPITAL | Age: 47
Discharge: HOME | End: 2022-09-14
Attending: NURSE PRACTITIONER
Payer: MEDICAID

## 2022-09-14 DIAGNOSIS — M54.50 LUMBAR PAIN: ICD-10-CM

## 2022-09-14 PROCEDURE — 72114 X-RAY EXAM L-S SPINE BENDING: CPT | Mod: TC

## 2022-09-14 NOTE — PROGRESS NOTES
Faculty Attestation: Hawa Ortiz  was seen in Family Medicine Clinic. Discussed with resident at the time of the visit. History of Present Illness, Physical Exam, and Assessment and Plan reviewed. Treatment plan is reasonable and appropriate. Compliance with treatment recommendations is important.  No imaging studies were done today.  No procedure was performed.     Joey De La Vega MD

## 2022-09-18 ENCOUNTER — HISTORICAL (OUTPATIENT)
Dept: ADMINISTRATIVE | Facility: HOSPITAL | Age: 47
End: 2022-09-18
Payer: MEDICAID

## 2022-09-19 ENCOUNTER — HISTORICAL (OUTPATIENT)
Dept: ADMINISTRATIVE | Facility: HOSPITAL | Age: 47
End: 2022-09-19
Payer: MEDICAID

## 2022-10-13 PROBLEM — R51.9 CHRONIC DAILY HEADACHE: Status: ACTIVE | Noted: 2022-10-13

## 2022-10-13 PROBLEM — G43.711 CHRONIC MIGRAINE WITHOUT AURA, INTRACTABLE, WITH STATUS MIGRAINOSUS: Status: ACTIVE | Noted: 2022-10-13

## 2022-10-17 ENCOUNTER — OFFICE VISIT (OUTPATIENT)
Dept: FAMILY MEDICINE | Facility: CLINIC | Age: 47
End: 2022-10-17
Payer: MEDICAID

## 2022-10-17 VITALS
HEIGHT: 66 IN | OXYGEN SATURATION: 100 % | BODY MASS INDEX: 28.91 KG/M2 | HEART RATE: 64 BPM | TEMPERATURE: 99 F | DIASTOLIC BLOOD PRESSURE: 77 MMHG | RESPIRATION RATE: 18 BRPM | SYSTOLIC BLOOD PRESSURE: 136 MMHG | WEIGHT: 179.88 LBS

## 2022-10-17 DIAGNOSIS — F31.9 BIPOLAR 1 DISORDER: Primary | ICD-10-CM

## 2022-10-17 DIAGNOSIS — Z00.00 HEALTHCARE MAINTENANCE: ICD-10-CM

## 2022-10-17 LAB
ALBUMIN SERPL-MCNC: 4.5 GM/DL (ref 3.5–5)
ALBUMIN/GLOB SERPL: 1.4 RATIO (ref 1.1–2)
ALP SERPL-CCNC: 52 UNIT/L (ref 40–150)
ALT SERPL-CCNC: 13 UNIT/L (ref 0–55)
AST SERPL-CCNC: 20 UNIT/L (ref 5–34)
BILIRUBIN DIRECT+TOT PNL SERPL-MCNC: 0.7 MG/DL
BUN SERPL-MCNC: 8.3 MG/DL (ref 7–18.7)
CALCIUM SERPL-MCNC: 9.8 MG/DL (ref 8.4–10.2)
CHLORIDE SERPL-SCNC: 107 MMOL/L (ref 98–107)
CHOLEST SERPL-MCNC: 154 MG/DL
CHOLEST/HDLC SERPL: 2 {RATIO} (ref 0–5)
CO2 SERPL-SCNC: 20 MMOL/L (ref 22–29)
CREAT SERPL-MCNC: 0.82 MG/DL (ref 0.55–1.02)
GFR SERPLBLD CREATININE-BSD FMLA CKD-EPI: >60 MLS/MIN/1.73/M2
GLOBULIN SER-MCNC: 3.3 GM/DL (ref 2.4–3.5)
GLUCOSE SERPL-MCNC: 85 MG/DL (ref 74–100)
HCV AB SERPL QL IA: NONREACTIVE
HDLC SERPL-MCNC: 65 MG/DL (ref 35–60)
HIV 1+2 AB+HIV1 P24 AG SERPL QL IA: NONREACTIVE
LDLC SERPL CALC-MCNC: 70 MG/DL (ref 50–140)
POTASSIUM SERPL-SCNC: 3.6 MMOL/L (ref 3.5–5.1)
PROT SERPL-MCNC: 7.8 GM/DL (ref 6.4–8.3)
SODIUM SERPL-SCNC: 139 MMOL/L (ref 136–145)
TRIGL SERPL-MCNC: 93 MG/DL (ref 37–140)
TSH SERPL-ACNC: 1.55 UIU/ML (ref 0.35–4.94)
VLDLC SERPL CALC-MCNC: 19 MG/DL

## 2022-10-17 PROCEDURE — 86803 HEPATITIS C AB TEST: CPT

## 2022-10-17 PROCEDURE — 36415 COLL VENOUS BLD VENIPUNCTURE: CPT

## 2022-10-17 PROCEDURE — 80053 COMPREHEN METABOLIC PANEL: CPT

## 2022-10-17 PROCEDURE — 99214 OFFICE O/P EST MOD 30 MIN: CPT | Mod: PBBFAC

## 2022-10-17 PROCEDURE — 90686 IIV4 VACC NO PRSV 0.5 ML IM: CPT | Mod: PBBFAC

## 2022-10-17 PROCEDURE — 84443 ASSAY THYROID STIM HORMONE: CPT

## 2022-10-17 PROCEDURE — 87389 HIV-1 AG W/HIV-1&-2 AB AG IA: CPT

## 2022-10-17 PROCEDURE — 80061 LIPID PANEL: CPT

## 2022-10-17 RX ORDER — MELOXICAM 15 MG/1
15 TABLET ORAL DAILY
Qty: 30 TABLET | Refills: 0 | Status: SHIPPED | OUTPATIENT
Start: 2022-10-17 | End: 2023-04-05

## 2022-10-17 RX ORDER — ESCITALOPRAM OXALATE 20 MG/1
20 TABLET ORAL DAILY
Qty: 30 TABLET | Refills: 11 | Status: SHIPPED | OUTPATIENT
Start: 2022-10-17 | End: 2023-08-15 | Stop reason: SDUPTHER

## 2022-10-17 RX ORDER — LURASIDONE HYDROCHLORIDE 60 MG/1
60 TABLET, FILM COATED ORAL DAILY
COMMUNITY
Start: 2022-09-15 | End: 2023-05-01 | Stop reason: SDUPTHER
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
## 2022-11-05 NOTE — PROGRESS NOTES
I have reviewed the notes, assessments, and/or procedures performed this visit, and I concur with the documentation.    Lamont Wisdom M.D.   Attending Attestation (For Attendings USE Only)...

## 2022-11-17 ENCOUNTER — OFFICE VISIT (OUTPATIENT)
Dept: FAMILY MEDICINE | Facility: CLINIC | Age: 47
End: 2022-11-17
Payer: MEDICAID

## 2022-11-17 VITALS
WEIGHT: 172.63 LBS | TEMPERATURE: 98 F | OXYGEN SATURATION: 98 % | HEART RATE: 72 BPM | DIASTOLIC BLOOD PRESSURE: 84 MMHG | SYSTOLIC BLOOD PRESSURE: 138 MMHG | HEIGHT: 66 IN | BODY MASS INDEX: 27.74 KG/M2

## 2022-11-17 DIAGNOSIS — R51.9 CHRONIC DAILY HEADACHE: Primary | ICD-10-CM

## 2022-11-17 DIAGNOSIS — Z13.31 POSITIVE DEPRESSION SCREENING: ICD-10-CM

## 2022-11-17 DIAGNOSIS — Z30.9 ENCOUNTER FOR CONTRACEPTIVE MANAGEMENT, UNSPECIFIED TYPE: ICD-10-CM

## 2022-11-17 DIAGNOSIS — G47.9 SLEEP DISTURBANCE: ICD-10-CM

## 2022-11-17 LAB
B-HCG UR QL: NEGATIVE
CTP QC/QA: YES

## 2022-11-17 PROCEDURE — 96372 THER/PROPH/DIAG INJ SC/IM: CPT | Mod: PBBFAC

## 2022-11-17 PROCEDURE — 81025 URINE PREGNANCY TEST: CPT | Mod: PBBFAC

## 2022-11-17 PROCEDURE — 99214 OFFICE O/P EST MOD 30 MIN: CPT | Mod: PBBFAC

## 2022-11-17 RX ORDER — MEDROXYPROGESTERONE ACETATE 150 MG/ML
150 INJECTION, SUSPENSION INTRAMUSCULAR
Status: DISCONTINUED | OUTPATIENT
Start: 2022-11-17 | End: 2023-12-07

## 2022-11-17 RX ADMIN — MEDROXYPROGESTERONE ACETATE 150 MG: 150 INJECTION, SUSPENSION, EXTENDED RELEASE INTRAMUSCULAR at 12:11

## 2022-11-17 NOTE — PATIENT INSTRUCTIONS
Future Appointments   Date Time Provider Department Center   11/29/2022 12:30 PM RESIDENT 2, [facility] OTORHINOLARYNGOLOGY [facility] ENT [place]   12/19/2022  8:20 AM RESIDENT 1, [facility] FAMILY MEDICINE [facility] FM RES [place]   1/5/2023  1:30 PM Jannet Marin, ANP [facility] NEURO [place]     Melatonin 3mg to start. Can go up to 6mg.  Headache diary given: Discussed plan with patient.  Consider trial of Aimovig or Emgality at next visit if no improvement in current regimen and plan.

## 2022-11-17 NOTE — PROGRESS NOTES
"Subjective:       Patient ID: Hawa Ortiz is a 47 y.o. female.    Chief Complaint: F/u Bipolar disorder, and C/o migraines not improving. Depo for periods.    HPI  This is a new-to-me, 48 y/o female here to f/u on Depression  And headaches.    Acute issues:  Migraines  -Currently taking Topamax 100 mg twice daily.  -Migraines occurring daily. Frontal area between eyes. Most recent as one day ago. Rests when headaches occur. Taking Tylenol to abort; unsure of dosage.     Sleep disturbance:  Patient able to go to sleep without difficulty. Unable to maintain sleep as she shares room with daughter and young grandchild who wakes often to feed. Grandchild sleeps in bassinet.  No television, phones or tablets at bedtime. Does not drink coffee or caffeinated beverages.    Wants to restart Depo for menorrhagia. Per chart review, was on from 2021 to 2022. Has been offered to continue in past, but declined at that time. Denies any side effects leading to discontinuation.  LMP 10/29/22 with heavy bleeding, which patient states is regular for her. Denies any intermenstrual bleeding or spotting.     Chronic issues:  Bipolar Depression  -Currently taking Lexapro 20 mg and Latuda 60mg.  -Complaint with medication daily  -Follows with [facility] and   Has next appointment in Jan.  -States home stressors are making depression worse. States she has too many responsibilities at home.  -Feels like doing nothing sometimes. Wants to "run and hide." No thoughts of self harm or suicide or harming others. Has spoken to staff at [facility]; recommends getting own place.          Healthcare maintenance  -Last Cervical Cancer Screen 2020 NIL  -Last Colorectal screen- Previously referred. Not completed.  -Mammogram 3/18/2023  -Tetanus Vaccine 1/8/2030   -Flu vaccine UTD  Review of Systems   Respiratory:  Negative for shortness of breath.    Cardiovascular:  Negative for chest pain.   Gastrointestinal:  Positive for nausea. Negative for " constipation, diarrhea and vomiting.   Genitourinary:  Positive for menstrual problem.   Neurological:  Positive for headaches. Negative for light-headedness.   Psychiatric/Behavioral:  Positive for sleep disturbance. Negative for agitation, confusion, hallucinations, self-injury and suicidal ideas.      Objective:      Vitals:    22 1112   BP: 138/84   Pulse: 72   Temp: 98.4 °F (36.9 °C)       Physical Exam   Constitutional: She is cooperative. No distress.   Cardiovascular: Normal rate, regular rhythm, normal heart sounds and normal pulses.   Pulmonary/Chest: Effort normal and breath sounds normal. She has no wheezes. She has no rhonchi. She has no rales.   Musculoskeletal:         General: Normal range of motion.      Right shoulder: Decreased strength.      Left shoulder: Decreased strength.      Comments: Shoulders: 3/5 strength bilaterally  Arms: 4/5 strength with external rotation and internal rotation with elbows flexed 90 degrees  Hand  WNL.  Hips: 5/5 strength seated leg raise  Knee: 5/5 strength with extension and flexion  Nontantalgic gait   Neurological: She is alert. She has normal sensation. She displays facial symmetry. No cranial nerve deficit.   Eyes: H test normal  Alternating hand movements normal.   Psychiatric: Her speech is normal. Her affect is not labile.   Some inappropriate laughter at times.     Assessment:       1. Chronic daily headache    2. Positive depression screening    3. Sleep disturbance    4. Encounter for contraceptive management, unspecified type        Plan:       Continue Topamax 100mg BID. Patient is at max dose of 200mg daily. Unclear at this time if 200mg once daily will better more effective. Given current sleep disturbance and life stressors, headaches may be more tension than migraine. Asked patient to locate misplaced Tylenol and look at dosing. If 325mg, would recommend at least 650mg to help in headache . Patient not taking NSAIDS due to  gastritis. Given headache diary and reviewed in detail. Would like her to write down date/time, triggers, character, w/w/o aura, therapy and effectiveness. Bring to next visit. May consider CGRP meds Aimovig or Emgality.  Currently under increased stress in household of 5. Feels overworked with others not 'doing anything to help.' Advised patient to contact [facility] before Jan. Appt. PHQ9 SCORE 9 (Mild). As patient currently followed, will not adjust medications. Discussed sleep as possible contributor to patient's change in mood. Continue to monitor.  Discussed sleep hygiene and trial of OTC Melatonin 3-6mg at bedtime. Patient believes she may have tried in past, but can't recall.      UPT negative. Patient states she had no unwanted side effects from previous short course of Depo Provera. Discussed with patient that Depo can precipitate feelings of depression, worsening her current situation. Patient aware and wants to proceed with injections. Provera given along with Perpetual Calendar for follow-up injections. Next dose to be given 2/6-2/16/23.    Current Outpatient Medications   Medication Sig Dispense Refill    azelastine (ASTELIN) 137 mcg (0.1 %) nasal spray 1 spray (137 mcg total) by Nasal route 2 (two) times daily. 30 mL 5    cetirizine (ZYRTEC) 10 MG tablet Take 1 tablet (10 mg total) by mouth once daily. 30 tablet 11    EScitalopram oxalate (LEXAPRO) 20 MG tablet Take 1 tablet (20 mg total) by mouth once daily. 30 tablet 11    famotidine (PEPCID) 20 MG tablet Take 1 tablet (20 mg total) by mouth daily as needed for Heartburn. 30 tablet 6    fluticasone propionate (FLONASE) 50 mcg/actuation nasal spray 1 spray (50 mcg total) by Each Nostril route 2 (two) times a day. 15.8 mL 12    gabapentin (NEURONTIN) 300 MG capsule TAKE 1 CAPSULE Y MOUTH AT BEDTIME FOR 30 DAYS 30 capsule 4    LATUDA 60 mg Tab tablet Take 60 mg by mouth once daily.      meloxicam (MOBIC) 15 MG tablet Take 1 tablet (15 mg  total) by mouth once daily. 30 tablet 0    pantoprazole (PROTONIX) 40 MG tablet Take 1 tablet (40 mg total) by mouth once daily. 30 tablet 11    sodium chloride (SALINE MIST) 0.65 % nasal spray 1 spray by Nasal route 2 (two) times a day. 15 mL 12    topiramate (TOPAMAX) 100 MG tablet TAKE 1 TABLET BY MOUTH TWICE A  tablet 1     Current Facility-Administered Medications   Medication Dose Route Frequency Provider Last Rate Last Admin    medroxyPROGESTERone (DEPO-PROVERA) syringe 150 mg  150 mg Intramuscular Q90 Days Archie Chen MD   150 mg at 11/17/22 1223     Future Appointments   Date Time Provider Department Center   11/29/2022 12:30 PM RESIDENT 2, [facility] OTORHINOLARYNGOLOGY [facility] ENT [facility]    12/19/2022  8:20 AM RESIDENT 1, [facility] FAMILY MEDICINE [facility] FM RES [place]    1/5/2023  1:30 PM Jannet Marin, ANP [facility] NEURO [facility]     Archie Chen MD, MPH  U  [facility]

## 2022-11-28 DIAGNOSIS — G43.711 CHRONIC MIGRAINE WITHOUT AURA, INTRACTABLE, WITH STATUS MIGRAINOSUS: ICD-10-CM

## 2022-11-29 ENCOUNTER — OFFICE VISIT (OUTPATIENT)
Dept: OTOLARYNGOLOGY | Facility: CLINIC | Age: 47
End: 2022-11-29
Payer: MEDICAID

## 2022-11-29 VITALS
SYSTOLIC BLOOD PRESSURE: 111 MMHG | WEIGHT: 176 LBS | TEMPERATURE: 98 F | DIASTOLIC BLOOD PRESSURE: 66 MMHG | BODY MASS INDEX: 28.41 KG/M2 | HEART RATE: 74 BPM

## 2022-11-29 DIAGNOSIS — R13.10 DYSPHAGIA, UNSPECIFIED TYPE: ICD-10-CM

## 2022-11-29 DIAGNOSIS — J30.9 ALLERGIC RHINITIS, UNSPECIFIED SEASONALITY, UNSPECIFIED TRIGGER: ICD-10-CM

## 2022-11-29 DIAGNOSIS — K21.9 LPRD (LARYNGOPHARYNGEAL REFLUX DISEASE): Primary | ICD-10-CM

## 2022-11-29 LAB — TSH SERPL-ACNC: 3.33 UIU/ML (ref 0.35–4.94)

## 2022-11-29 PROCEDURE — 84443 ASSAY THYROID STIM HORMONE: CPT

## 2022-11-29 PROCEDURE — 99213 OFFICE O/P EST LOW 20 MIN: CPT | Mod: PBBFAC | Performed by: STUDENT IN AN ORGANIZED HEALTH CARE EDUCATION/TRAINING PROGRAM

## 2022-11-29 RX ORDER — FLUTICASONE PROPIONATE 50 MCG
1 SPRAY, SUSPENSION (ML) NASAL 2 TIMES DAILY
Qty: 15.8 ML | Refills: 12 | Status: SHIPPED | OUTPATIENT
Start: 2022-11-29

## 2022-11-29 RX ORDER — PANTOPRAZOLE SODIUM 40 MG/1
40 TABLET, DELAYED RELEASE ORAL DAILY
Qty: 30 TABLET | Refills: 11 | Status: SHIPPED | OUTPATIENT
Start: 2022-11-29 | End: 2023-08-16

## 2022-11-29 RX ORDER — AZELASTINE 1 MG/ML
1 SPRAY, METERED NASAL 2 TIMES DAILY
Qty: 30 ML | Refills: 5 | Status: SHIPPED | OUTPATIENT
Start: 2022-11-29 | End: 2023-09-07

## 2022-11-29 RX ORDER — TOPIRAMATE 100 MG/1
100 TABLET, FILM COATED ORAL 2 TIMES DAILY
Qty: 180 TABLET | Refills: 1 | Status: SHIPPED | OUTPATIENT
Start: 2022-11-29 | End: 2023-01-05 | Stop reason: SDUPTHER

## 2022-11-29 RX ORDER — CETIRIZINE HYDROCHLORIDE 10 MG/1
10 TABLET ORAL DAILY
Qty: 30 TABLET | Refills: 11 | Status: SHIPPED | OUTPATIENT
Start: 2022-11-29 | End: 2022-12-19 | Stop reason: SDUPTHER

## 2022-11-29 RX ORDER — SOD CHLOR,BICARB/SQUEEZ BOTTLE
1 PACKET, WITH RINSE DEVICE NASAL 2 TIMES DAILY
Qty: 30 EACH | Refills: 12 | Status: SHIPPED | OUTPATIENT
Start: 2022-11-29 | End: 2023-01-05

## 2022-11-29 NOTE — PROGRESS NOTES
[facility]  Otolaryngology Clinic Note    Hawa Ortiz  Encounter Date: 11/29/2022  YOB: 1975  Physician: Walter Aguirre MD    Chief Complaint: dysphagia    HPI: Hawa Ortiz is a 47 y.o. female sent by her primary care physician for persistent dysphagia, sore throat particularly to water, difficulty with swallowing meats, intermittent hoarseness, postnasal drip, itchy eyes and nose, anterior rhinorrhea, sneezing, anterior neck discomfort.  She reports this has been as long as she can recall.  She had a thyroid ultrasound which had demonstrated thyroid nodules not meeting FNA criteria.  Her throat discomfort and nonproductive cough are worse at night prior to going to bed.  She frequently feels like solids gets stuck in her throat and require great effort to push down.    8/25/22: Here today for follow up. Continues to endorse throat pain that is worse at night.  She states she does have reflux symptoms including burning chest pain and acid taste in her mouth. Occasionally loses her voice.  She does not take any medication for this. We discussed foods to avoid however she states where she lives, other people buy the food and she has no control over it. Unfortunately she eats a lot of friend chicken and pizza.  Has been using flonase and astelin but does not feel this has helped much with her nasal symptoms.     11/29/22:  Returns for follow up.  Obtained RAST testing today, results not back yet.  Reports using flonase and astelin.  Reports she has dysphagia to meats only.  No problems with liquids.  Has not seen GI yet.  Reports she's had EGDs done before with last one being around 2000/2001.    ROS:   General: Negative except per HPI  Skin: Denies rash, ulcer, or lesion.  Eyes: Denies vision changes or diplopia.  Ears: Negative except per HPI  Nose: Negative except per HPI  Throat/mouth: Negative except per HPI  Cardiovascular: Negative except per HPI  Respiratory:  Negative except per HPI  Neck: Negative except per HPI  Endocrine: Negative except per HPI  Neurologic: Negative except per HPI    Other 10-point review of systems negative except per HPI      Review of patient's allergies indicates:  No Known Allergies    History reviewed. No pertinent past medical history.    Past Surgical History:   Procedure Laterality Date    CHOLECYSTECTOMY      EYE SURGERY Bilateral        Social History     Socioeconomic History    Marital status: Single   Tobacco Use    Smoking status: Former     Types: Cigarettes    Smokeless tobacco: Never   Substance and Sexual Activity    Alcohol use: Never    Drug use: Never    Sexual activity: Not Currently       History reviewed. No pertinent family history.    Outpatient Encounter Medications as of 11/29/2022   Medication Sig Dispense Refill    azelastine (ASTELIN) 137 mcg (0.1 %) nasal spray 1 spray (137 mcg total) by Nasal route 2 (two) times daily. 30 mL 5    cetirizine (ZYRTEC) 10 MG tablet Take 1 tablet (10 mg total) by mouth once daily. 30 tablet 11    EScitalopram oxalate (LEXAPRO) 20 MG tablet Take 1 tablet (20 mg total) by mouth once daily. 30 tablet 11    famotidine (PEPCID) 20 MG tablet Take 1 tablet (20 mg total) by mouth daily as needed for Heartburn. 30 tablet 6    fluticasone propionate (FLONASE) 50 mcg/actuation nasal spray 1 spray (50 mcg total) by Each Nostril route 2 (two) times a day. 15.8 mL 12    gabapentin (NEURONTIN) 300 MG capsule TAKE 1 CAPSULE Y MOUTH AT BEDTIME FOR 30 DAYS 30 capsule 4    LATUDA 60 mg Tab tablet Take 60 mg by mouth once daily.      meloxicam (MOBIC) 15 MG tablet Take 1 tablet (15 mg total) by mouth once daily. 30 tablet 0    pantoprazole (PROTONIX) 40 MG tablet Take 1 tablet (40 mg total) by mouth once daily. 30 tablet 11    sodium chloride (SALINE MIST) 0.65 % nasal spray 1 spray by Nasal route 2 (two) times a day. 15 mL 12    topiramate (TOPAMAX) 100 MG tablet Take 1 tablet (100 mg total) by mouth 2  (two) times daily. 180 tablet 1    [DISCONTINUED] topiramate (TOPAMAX) 100 MG tablet TAKE 1 TABLET BY MOUTH TWICE A  tablet 1     Facility-Administered Encounter Medications as of 11/29/2022   Medication Dose Route Frequency Provider Last Rate Last Admin    medroxyPROGESTERone (DEPO-PROVERA) syringe 150 mg  150 mg Intramuscular Q90 Days Archie Chen MD   150 mg at 11/17/22 1223       Physical Exam:  Vitals:    11/29/22 1303   BP: 111/66   Pulse: 74   Temp: 98.4 °F (36.9 °C)   TempSrc: Oral   Weight: 79.8 kg (176 lb)       Constitutional  General Appearance: well nourished, well-developed, AAO x3, NAD  HEENT  Eyes: PEERLA, EOMI, normal conjunctivae  Ears: Hearing well at conversation level   AD: auricle normal, EAC normal, TM intact, no RAFA   AS: auricle normal, EAC normal, TM intact, no RAFA   Vestibular: ambulates without gait disturbance  Nose: septum midline, no inferior turbinate hypertrophy, no polyps, moist mucosa  OC/OP: dentition poor, no oral lesions, tongue/FOM/BOT- soft, no leukoplakia/ulcerations/ tenderness  Neck: soft, non-tender, no palpable lymph nodes   Thyroid region- no nodules or goiter  Neuro: CN II - XII intact bilaterally  Cardiovascular: peripheral pulses palpable  Respiratory: non-labored respirations  Psychiatric: oriented to time, place and person, no depression, anxiety or agitation      Assessment/Plan:  Hawa Ortiz is a 47 y.o. female with LPRD/GERD, allergic rhinitis. Has subcentimeter right thyroid nodules, nearly 2 cm cystic nodule of left thyroid, concern for eosinophilic esophagitis    - Start NeilMed sinus rinse BID  - Continue flonase and astelin. Went over correct way to administer medications.   - Protonix daily  - Obtain MBSS  - Observe thyroid cystic nodule for now, repeat thyroid US in 3 months  - Refer to GI for dysphagia, concerning for eosinophilic esophagitis based on dysphagia only to meats and atopy picture  - RTC  3 months

## 2022-12-19 ENCOUNTER — OFFICE VISIT (OUTPATIENT)
Dept: FAMILY MEDICINE | Facility: CLINIC | Age: 47
End: 2022-12-19
Payer: MEDICAID

## 2022-12-19 VITALS
HEIGHT: 66 IN | RESPIRATION RATE: 16 BRPM | BODY MASS INDEX: 27.85 KG/M2 | OXYGEN SATURATION: 100 % | WEIGHT: 173.31 LBS | TEMPERATURE: 98 F | HEART RATE: 64 BPM | DIASTOLIC BLOOD PRESSURE: 68 MMHG | SYSTOLIC BLOOD PRESSURE: 100 MMHG

## 2022-12-19 DIAGNOSIS — D50.9 IRON DEFICIENCY ANEMIA, UNSPECIFIED IRON DEFICIENCY ANEMIA TYPE: ICD-10-CM

## 2022-12-19 DIAGNOSIS — R05.9 COUGH, UNSPECIFIED TYPE: Primary | ICD-10-CM

## 2022-12-19 LAB
APPEARANCE UR: ABNORMAL
BACTERIA #/AREA URNS AUTO: ABNORMAL /HPF
BILIRUB SERPL-MCNC: NORMAL MG/DL
BILIRUB UR QL STRIP.AUTO: NEGATIVE MG/DL
BLOOD URINE, POC: NORMAL
CLARITY, POC UA: CLEAR
COLOR UR AUTO: YELLOW
COLOR, POC UA: NORMAL
GLUCOSE UR QL STRIP.AUTO: NORMAL MG/DL
GLUCOSE UR QL STRIP: NORMAL
HYALINE CASTS #/AREA URNS LPF: ABNORMAL /LPF
IRON SATN MFR SERPL: 5 % (ref 20–50)
IRON SERPL-MCNC: 19 UG/DL (ref 50–170)
KETONES UR QL STRIP.AUTO: NEGATIVE MG/DL
KETONES UR QL STRIP: NORMAL
LEUKOCYTE ESTERASE UR QL STRIP.AUTO: NEGATIVE UNIT/L
LEUKOCYTE ESTERASE URINE, POC: NORMAL
MUCOUS THREADS URNS QL MICRO: ABNORMAL /LPF
NITRITE UR QL STRIP.AUTO: NEGATIVE
NITRITE, POC UA: NORMAL
PH UR STRIP.AUTO: 5 [PH]
PH, POC UA: 5.5
PROT UR QL STRIP.AUTO: NEGATIVE MG/DL
PROTEIN, POC: NORMAL
RBC #/AREA URNS AUTO: ABNORMAL /HPF
RBC UR QL AUTO: ABNORMAL UNIT/L
SP GR UR STRIP.AUTO: 1.02
SPECIFIC GRAVITY, POC UA: >1.03
SQUAMOUS #/AREA URNS LPF: ABNORMAL /HPF
TIBC SERPL-MCNC: 341 UG/DL (ref 70–310)
TIBC SERPL-MCNC: 360 UG/DL (ref 250–450)
TRANSFERRIN SERPL-MCNC: 327 MG/DL (ref 180–382)
UROBILINOGEN UR STRIP-ACNC: NORMAL MG/DL
UROBILINOGEN, POC UA: 0.2
WBC #/AREA URNS AUTO: ABNORMAL /HPF

## 2022-12-19 PROCEDURE — 83550 IRON BINDING TEST: CPT

## 2022-12-19 PROCEDURE — 36415 COLL VENOUS BLD VENIPUNCTURE: CPT

## 2022-12-19 PROCEDURE — 81002 URINALYSIS NONAUTO W/O SCOPE: CPT | Mod: PBBFAC,59

## 2022-12-19 PROCEDURE — 99215 OFFICE O/P EST HI 40 MIN: CPT | Mod: PBBFAC

## 2022-12-19 PROCEDURE — 81001 URINALYSIS AUTO W/SCOPE: CPT

## 2022-12-19 RX ORDER — CETIRIZINE HYDROCHLORIDE 10 MG/1
10 TABLET ORAL DAILY
Qty: 30 TABLET | Refills: 2 | Status: SHIPPED | OUTPATIENT
Start: 2022-12-19 | End: 2023-01-30 | Stop reason: SDUPTHER

## 2022-12-19 RX ORDER — FERROUS SULFATE 325(65) MG
325 TABLET, DELAYED RELEASE (ENTERIC COATED) ORAL DAILY
Qty: 90 TABLET | Refills: 3 | Status: SHIPPED | OUTPATIENT
Start: 2022-12-19 | End: 2023-12-07 | Stop reason: SDUPTHER

## 2022-12-19 NOTE — PROGRESS NOTES
I have discussed the case with the resident and reviewed the resident's history and physical, assessment, plan, and progress note. I agree with the findings.       Jesus Yarbrough MD  [facility]

## 2022-12-19 NOTE — PROGRESS NOTES
Subjective:       Patient ID: Hawa Ortiz is a 47 y.o. female.    Chief Complaint: Cough    HPI  48 yo female presenting with complaints of nonproductive cough and fatigue. States cough has been present since last visit. Given Zyrtec but ran out.    Feels tired in morning despite going to bed [id number] and rising [id number]. Nonrestful sleep.    Lab review:  H/H drastically lower than previous year. Patient referred for colonoscopy in [place], but had no transportation. Reports was told not to eat red meats, can't recall who recommended.    Review of Systems   Cardiovascular:  Negative for chest pain.   Gastrointestinal:  Negative for blood in stool, constipation, diarrhea and vomiting.   Neurological:  Negative for light-headedness and headaches.     Objective:      Vitals:    12/19/22 0826   BP: 100/68   Pulse: 64   Resp: 16   Temp: 98.1 °F (36.7 °C)       Physical Exam   HENT:   Mouth/Throat: Mucous membranes are moist. Mucous membranes are pale. She does not have dentures. Normal dentition. Oropharynx is clear.   Eyes: Lids are normal.   No subconjunctival pallor.   Cardiovascular: Normal rate and regular rhythm.   Pulmonary/Chest: Effort normal. She has no wheezes. She has no rhonchi.   Abdominal: Soft. There is no abdominal tenderness.   Musculoskeletal:         General: Normal range of motion.     Assessment:       1. Cough, unspecified type    2. Iron deficiency anemia, unspecified iron deficiency anemia type        Plan:       Per ENT, cough 2/2 eosinophilic esophagitis or GERD. Refilled Zyrtec. Continue Flonase and PPI as ordered by ENT. Drink plenty of fluids.  Will continue to monitor cough for worsening.  11/29/22 labs show MCV 71 (microcytic). Ordered iron panel and urinalysis. Will review results once received. Urgent referral placed to Dr. Saxena for evaluation. Order for iron tablets sent to patient's pharmacy.    RTC in 6 weeks or sooner if no improvement.    Future Appointments   Date Time  Provider Department Center   1/5/2023  1:30 PM Jannet Marin, ANP [facility] NEURO [facility]   1/30/2023  1:00 PM RESIDENT 1, [facility] FAMILY MEDICINE [facility] FM RES [facility]   5/30/2023 12:00 PM RESIDENT 2, [facility] OTORHINOLARYNGOLOGY [facility] ENT Sergashish Chen MD, MPH  [facility]

## 2023-01-04 NOTE — PROGRESS NOTES
Subjective:       Patient ID: Hawa Ortiz is a 47 y.o. female.    Chief Complaint: Migraine (Pt stated last migraine two weeks ago. Pt states migraine are about the same)    HPI  This is a 47 year old Right Handed F with PMHX of HTN, GERD, mild intellectual delay, who was referred for headache disorder. Patient was last seen on 4/13/2022. During that visit, gabapentin 300mg BID and TPM 100mg BID were continued.     Today, Pt states migraine are the same, averages 4/week. Tylenol no longer effective. Ran out of oj 1 week ago. Lives with people who smoke outside now (smoked inside before). Cannot take NSAIDS as she states she has GI issues. Rx for cyclobenzaprine PRN. Hx of cataract sx OD in January, OS March 2022. States. she is not sleeping well at night as her daughter and granddaughter now live with her.    Age of Onset - childhood    Semiology -  frontal, dull, headache, w/o nausea, w/ photophobia, lasting around all day - 30 headache days per month    Frontal, pounding, headache, w/ nausea, w/ photophobia, w/ phonophobia - 0 migraine headache days per month.    Frequency - 28 headaches/month w/4 migraine headache days per month.    Exacerbating Factors - none    Risk Factors -  - Family history of headache disorder? denied  - History of Head Trauma? unable to recall  - History of CNS infection? denied  - History of underlying mood disorder? + mood swing  - Illicit drug use? denied  - Tobacco use? denied  - History of sleep disorder? Repeat PSG 12/21/2020 - snoring     Workup -  - MRI Brain +/- Jason 4/2019 - I have reviewed the study independently and with the patient. Unremarkable MRI Brain.    Current ppx meds -  TPM 100mg BID (2/25/2021- present) - denied any renal stones.  Gabapentin 300mg BID (9/29/2021 - present)    Current abortive meds -  Tylenol 2 tabs PRN - intermittently effective    Prior ppx meds -  Effexor 37.5mg daily (7/1/2019 - 9/18/2019) - ineffective  Nortriptyline 25mg qhs  (5/2019-6/1/2019) - ineffective  Elavil 25mg qhs (10/1/2018 - 10/28/2018) - ineffective    Prior abortive meds -   Fioricet prn -   Baclofen BID prn    Review of Systems    Constitutional: no fever, + fatigue, weakness  Eye: no vision loss, eye redness, drainage, or pain  ENMT: no sore throat, ear pain, sinus pain/congestion, nasal congestion/drainage  Respiratory: no cough, no wheezing, no shortness of breath  Cardiovascular: no chest pain, no palpitations, no edema  Gastrointestinal: no nausea, vomiting, or diarrhea. No abdominal pain  Genitourinary: no dysuria, no urinary frequency or urgency, no hematuria  Hema/Lymph: no abnormal bruising or bleeding  Endocrine: no heat or cold intolerance, no excessive thirst or excessive urination  Musculoskeletal: no muscle or joint pain, no joint swelling  Integumentary: no skin rash or abnormal lesion  Psychiatric: + depressed mood,+ anxiety, no visual/auditory hallucinations, no delusions, no suicidal or homicidal ideation  Neurologic: as per HPI    Objective:      Physical Exam    General: well-developed well-nourished in no acute distress  Eye: clear conjunctiva, eyelids normal  HENT: TMs/ear canals clear, oropharynx without erythema/exudate, oropharynx and nasal mucosal surfaces moist, no maxillary/frontal sinus tenderness to palpation  Neck: full range of motion, no thyromegaly or lymphadenopathy  Respiratory: clear to auscultation bilaterally  Cardiovascular: regular rate and rhythm without murmurs, gallops or rubs  Gastrointestinal: soft, non-tender, non-distended with normal bowel sounds, without masses to palpation  Genitourinary: no CVA tenderness to palpation  Musculoskeletal: full range of motion of all extremities/spine without limitation or discomfort  Integumentary: no rashes or skin lesions present  Neurologic:  Mental Status- Alert and Oriented to time, self, place, Speech is fluent, with intact repetition, naming, reading, and comprehension., No  Dysarthria.  CN II-XII - CN I Deferred, ROSELINE, no RAPD, VA grossly normal to finger counting at 6ft, No ptosis b/l, EOMI w/o nystagmus, LT/PP symmetric, intact in CN V1-V3 distribution b/l, masseter symmetric b/l, T/U midline, Shoulder shrug symmetric b/l, Hearing grossly intact b/l, VFFC, Face Symmetric.  Motor - Tone and Bulk nml throughout, No involuntary movements, 5/5 to confrontation throughout, FFM nml b/l, No pronator drift.  Sensory - LT/PP/Temp/Proprioception/Vibration symmetric intact throughout.  Reflexes - 2+ Throughout  Cerebellar Exam - FNF wnl b/l no intention tremor.  Romberg - negative.  Gait - Normal, bilat arm swing intact    Assessment:       This is a 47 year old Right Handed F with PMHX of HTN, GERD, JESU, mild intellectual delay, who was referred for headache. PSG showed snoring. Migraine same 4/month. Tylenol no longer effective. Discussed not being exposed second hand smoke as it could exacerbate HA. Cannot take NSAIDS.     1. Chronic migraine without aura, intractable, with status migrainosus  - gabapentin (NEURONTIN) 300 MG capsule; Take 1 capsule (300 mg total) by mouth 3 (three) times daily.  Dispense: 90 capsule; Refill: 4  - topiramate (TOPAMAX) 100 MG tablet; Take 1 tablet (100 mg total) by mouth 2 (two) times daily.  Dispense: 180 tablet; Refill: 3    2. Chronic daily headache    3. Headache  - gabapentin (NEURONTIN) 300 MG capsule; Take 1 capsule (300 mg total) by mouth 3 (three) times daily.  Dispense: 90 capsule; Refill: 4      Plan:       [] c/w TPM 100mg BID   [] increase Neurontin 300mg to TID  [] start rizatriptan 10mg PO BID PRN migraine    RTC 3 months    Headache education provided - including good sleep hygiene, stress management, medication overuse education provided - using more 3 OTC per week may worsen headaches, High intensity interval training has shown to reduce headache frequency. Low carb, high protein has shown to reduce headache frequency. Patient is instructed  to keep headache diary    I have explained the treatment plan, diagnosis, and prognosis to the patient, caretaker, family. All questions are answered to the best of my knowledge.    Face to Face Time 30 minute, including, counseling, education, review of test results, relevant medical records, and coordination of care.

## 2023-01-04 NOTE — PROGRESS NOTES
Faculty Attestation: Patient was seen in [facility] Family Medicine clinic. Patient was seen and examined by the resident.  I have personally reviewed History of the Present Illness , Review of Systems, PFSH , Physical Exam, Assessment and Plan documented by the resident. I was immediately available throughout the encounter. Importance of adherence to treatment recommendations discussed with patient at the time of the visit. Services were furnished in a primary care center in the outpatient department at a [facility]. I discussed the patient with the resident and agree with resident's findings and plan as documented in the resident note. Ivette Sheehan

## 2023-01-05 ENCOUNTER — OFFICE VISIT (OUTPATIENT)
Dept: NEUROLOGY | Facility: CLINIC | Age: 48
End: 2023-01-05
Payer: MEDICAID

## 2023-01-05 VITALS
OXYGEN SATURATION: 99 % | DIASTOLIC BLOOD PRESSURE: 68 MMHG | HEART RATE: 66 BPM | WEIGHT: 172.81 LBS | HEIGHT: 66 IN | SYSTOLIC BLOOD PRESSURE: 102 MMHG | TEMPERATURE: 98 F | RESPIRATION RATE: 20 BRPM | BODY MASS INDEX: 27.77 KG/M2

## 2023-01-05 DIAGNOSIS — G43.711 CHRONIC MIGRAINE WITHOUT AURA, INTRACTABLE, WITH STATUS MIGRAINOSUS: Primary | ICD-10-CM

## 2023-01-05 DIAGNOSIS — R51.9 HEADACHE: ICD-10-CM

## 2023-01-05 DIAGNOSIS — R51.9 CHRONIC DAILY HEADACHE: ICD-10-CM

## 2023-01-05 PROCEDURE — 99215 OFFICE O/P EST HI 40 MIN: CPT | Mod: PBBFAC | Performed by: NURSE PRACTITIONER

## 2023-01-05 PROCEDURE — 1160F PR REVIEW ALL MEDS BY PRESCRIBER/CLIN PHARMACIST DOCUMENTED: ICD-10-PCS | Mod: CPTII,,, | Performed by: NURSE PRACTITIONER

## 2023-01-05 PROCEDURE — 3074F SYST BP LT 130 MM HG: CPT | Mod: CPTII,,, | Performed by: NURSE PRACTITIONER

## 2023-01-05 PROCEDURE — 99214 OFFICE O/P EST MOD 30 MIN: CPT | Mod: S$PBB,,, | Performed by: NURSE PRACTITIONER

## 2023-01-05 PROCEDURE — 3008F PR BODY MASS INDEX (BMI) DOCUMENTED: ICD-10-PCS | Mod: CPTII,,, | Performed by: NURSE PRACTITIONER

## 2023-01-05 PROCEDURE — 3008F BODY MASS INDEX DOCD: CPT | Mod: CPTII,,, | Performed by: NURSE PRACTITIONER

## 2023-01-05 PROCEDURE — 3074F PR MOST RECENT SYSTOLIC BLOOD PRESSURE < 130 MM HG: ICD-10-PCS | Mod: CPTII,,, | Performed by: NURSE PRACTITIONER

## 2023-01-05 PROCEDURE — 1159F MED LIST DOCD IN RCRD: CPT | Mod: CPTII,,, | Performed by: NURSE PRACTITIONER

## 2023-01-05 PROCEDURE — 3078F DIAST BP <80 MM HG: CPT | Mod: CPTII,,, | Performed by: NURSE PRACTITIONER

## 2023-01-05 PROCEDURE — 1160F RVW MEDS BY RX/DR IN RCRD: CPT | Mod: CPTII,,, | Performed by: NURSE PRACTITIONER

## 2023-01-05 PROCEDURE — 99214 PR OFFICE/OUTPT VISIT, EST, LEVL IV, 30-39 MIN: ICD-10-PCS | Mod: S$PBB,,, | Performed by: NURSE PRACTITIONER

## 2023-01-05 PROCEDURE — 3078F PR MOST RECENT DIASTOLIC BLOOD PRESSURE < 80 MM HG: ICD-10-PCS | Mod: CPTII,,, | Performed by: NURSE PRACTITIONER

## 2023-01-05 PROCEDURE — 1159F PR MEDICATION LIST DOCUMENTED IN MEDICAL RECORD: ICD-10-PCS | Mod: CPTII,,, | Performed by: NURSE PRACTITIONER

## 2023-01-05 RX ORDER — TOPIRAMATE 100 MG/1
100 TABLET, FILM COATED ORAL 2 TIMES DAILY
Qty: 180 TABLET | Refills: 3 | Status: SHIPPED | OUTPATIENT
Start: 2023-01-05 | End: 2023-04-05 | Stop reason: SDUPTHER

## 2023-01-05 RX ORDER — GABAPENTIN 300 MG/1
300 CAPSULE ORAL 3 TIMES DAILY
Qty: 90 CAPSULE | Refills: 4 | Status: SHIPPED | OUTPATIENT
Start: 2023-01-05 | End: 2023-04-05 | Stop reason: SDUPTHER

## 2023-01-05 RX ORDER — RIZATRIPTAN BENZOATE 10 MG/1
10 TABLET ORAL 2 TIMES DAILY PRN
Qty: 12 TABLET | Refills: 4 | Status: SHIPPED | OUTPATIENT
Start: 2023-01-05 | End: 2023-04-05 | Stop reason: SDUPTHER

## 2023-01-05 NOTE — PATIENT INSTRUCTIONS
Start rizatriptan 10mg 1 twice daily as needed at onset of migraine  Continue with Topamax 100mg twice daily for migraine  Increase gabapentin 300mg to three times daily

## 2023-01-23 ENCOUNTER — CLINICAL SUPPORT (OUTPATIENT)
Dept: REHABILITATION | Facility: HOSPITAL | Age: 48
End: 2023-01-23
Attending: STUDENT IN AN ORGANIZED HEALTH CARE EDUCATION/TRAINING PROGRAM
Payer: MEDICAID

## 2023-01-23 ENCOUNTER — HOSPITAL ENCOUNTER (OUTPATIENT)
Dept: RADIOLOGY | Facility: HOSPITAL | Age: 48
Discharge: HOME | End: 2023-01-23
Attending: STUDENT IN AN ORGANIZED HEALTH CARE EDUCATION/TRAINING PROGRAM
Payer: MEDICAID

## 2023-01-23 DIAGNOSIS — R13.10 DYSPHAGIA, UNSPECIFIED TYPE: ICD-10-CM

## 2023-01-23 PROCEDURE — 92611 MOTION FLUOROSCOPY/SWALLOW: CPT

## 2023-01-23 PROCEDURE — 74230 X-RAY XM SWLNG FUNCJ C+: CPT | Mod: TC

## 2023-01-23 NOTE — PROGRESS NOTES
"  [facility]  MODIFIED BARIUM SWALLOW STUDY  Outpatient    Date: 1/23/2023     Name: Hawa Ortiz   MRN: [id number]    Therapy Diagnosis: WFL oral and pharyngeal phases of the swallow   Physician: Walter Aguirre MD  Physician Orders: SLP Video Swallow  Medical Diagnosis from Referral: Dysphagia    Date of Evaluation:  1/23/2023    Time In:  1310  Time Out:  1340  Total Billable Time: 30     Procedure Min.   Fl Modified Barium Swallow Speech  30     Precautions: Standard    Subjective   Date of Onset: Pt reported dysphagia for approximately 17 years. Numerous "scopes" in GI with no "issues" noted from study.    Past Medical History: Hawa Ortiz  has no past medical history on file.  Hawa Ortiz  has a past surgical history that includes Cholecystectomy and Eye surgery (Bilateral).    Patient Active Problem List   Diagnosis    Dysphagia    Allergic rhinitis    LPRD (laryngopharyngeal reflux disease)    Lumbar pain    Radiculopathy    Depression    Chronic migraine without aura, intractable, with status migrainosus    Chronic daily headache     The patient is a 47 y.o. female who complains of difficulty swallowing solids and difficulty swallowing pills.     The patient gave the following history:   -Current diet at home: regular with thin liquids  -Recommended diet from previous study: no previous MBSS completed  -Therapy received: no noted  -Neurological: Pt denied any neurological diagnoses.  -Gastroenterologist (GI) :  Pt with previous studies with 'no issues' noted    -Pulmonary: Pt denied any pulmonary diagnoses.   -Surgery: NA  -Cancer: NA    The following observations were made:   -Mental status: Alert, Cooperative, and Anxious  -Factors affecting performance: no difficulties participating in the study  -Feeding Method: independent in self-feeding    Respiratory Status:   -Respiratory Status: room air  -Tracheostomy type: NA    Medical Hx and Allergies:  Review of patient's " allergies indicates:  No Known Allergies    Pain Scale:  0/10 on VAS currently.   Pain Location: bilateral    Objective     Modified Barium Swallow Study  A modified barium swallow study was ordered to assess swallow effectiveness, ID/rule out penetration and aspiration and make appropriate recommendations regarding safest diet consistency, effective compensatory strategies and safe eating environment.      The patient was seen in radiology seated in High Anderson's position in a video imaging chair for lateral views of the larynx. The study was conducted using Varibar thin liquid (IDDSI 0), Varibar pudding (IDDSI 4), Peaches covered in Varibar powder (IDDSI 6/2), solid coated in Varibar pudding (IDDSI 7), and barium tablet . She tolerated the procedure well.     A cranial nerve examination revealed the following:  Cranial Nerve Examination  Cranial Nerve 5: Trigeminal Nerve  Motor Jaw Posture at rest: Closed  Mandible Elevation/Depression: WFL  Mandible lateralization: WFL  Abnormal movement: absent Interpretation:   intact   Sensory Forehead: WFL  Cheek: WFL  Jaw: WFL  Facial Pain: None noted Interpretation:  intact      Cranial Nerve 7: Facial Nerve  Motor Facial Symmetry: WNL  Wrinkle Forehead: WFL  Close eyes tightly: WFL  Labial Protrusion: WFL  Labial Retraction: WFL  Abnormal movement: absent Interpretation:   intact   Sensory Formal testing not completed. Patient denied any changes in taste      Cranial Nerves IX and X: Glossopharyngeal and Vagus Nerves  Motor Palatal Symmetry (Rest): WNL  Palatal Symmetry (Movement): WNL  Cough: Perceptually strong  Voice Prior to PO intake: Clear  Resonance: Normal  Abnormal movement: absent Interpretation:   intact     Cranial Nerve XII: Hypoglossal Nerve  Motor Tongue at rest: WNL  Lingual Protrusion: WNL  Lingual Protrusion against Resistance: WNL  Lingual Lateralization: WNL  Abnormal movement: absent Interpretation:   intact     Other information:  Volitional Swallow:  Able to palpate laryngeal rise  Mucosal Quality: No abnormal findings  Secretion Management: Secretion Mgmt: adequate  Dentition: Good condition for speech and mastication     CONSISTENCIES ADMINISTERED:  Thin Liquids (IDDSI 0):  Mode: self-regulated cup sip  Oral Residue: none    Vallecular Residue: trace to mild   Pyriform Sinus Residue: trace to mild   Rosenbeck's 8-Point Penetration-Aspiration Scale:   Best: (1) Material does not enter the airway  penetration and aspiration did not occur during the assessment with thin liquids via cup  Strategies attempted:  spontaneous double swallow noted to clear residue  Barium Tablet: pt refused to attempt trial with thin liquids due to anxiety.    Mildly Thick Liquids (IDDSI 2):  Not assessed    Puree (IDDSI 4):  Mode: self-regulated via spoon  Oral Residue: none  Vallecular Residue: trace to mild  Pyriform Sinus Residue: none  Rosenbeck's 8-Point Penetration-Aspiration Scale:   Best: (1) Material does not enter the airway  penetration and aspiration did not occur during the assessment.  Strategies attempted:  spontaneous double swallow noted to clear residue  Barium Tablet:  barium tablet with pureed was attempted, however, pt was unable to clear from oral cavity    Mixed (Soft and Bite Sized) Consistency Bolus (IDDSI 6 with IDDSI 2):  Trial not attempted    Regular (IDDSI 7):  Mode and volume administered: Aditya cracker in barium pudding   Oral Residue: none   Vallecular Residue: trace to mild  Pyriform Sinus Residue: none   Rosenbeck's 8-Point Penetration-Aspiration Scale:   Best: (1) Material does not enter the airway  penetration and aspiration did not occur during this assessment.   Strategies attempted:  spontaneous double swallow noted to clear residue    Treatment   Treatment Time In: n/a  Treatment Time Out: n/a  Total Treatment Time: n/a  Patient educated regarding results and recommendations of the evaluation. See the recommendations section  below.    Education: SLP role in care, Plan of care, Results of the study, diet recommendations, and standard aspiration precautions to reduce food anxiety were discussed with the patient. Patient expressed understanding. Patient noted to become very emotional during education and endorsed extreme food anxiety with all PO intake. SLP encouraged reduced meal size, frequent meals and reduced bite size to reduce anxiety.     SLP educated patient on MBSS results and diet recommendations. SLP allowed patient to view MBSS via DC unit within depth explanation of anatomy and function of swallow. SLP provided visual evidence of safe swallow.  SLP provided OP MBSS recommendations and aspiration precaution handout. All questions and comments addressed and comprehension demonstrated.     Barriers to Learning:  anxiety    Teaching Method: Verbal, Written, Audio/Visual  Assessment     Hawa Ortiz is a 47 y.o. female referred for Modified Barium Swallow Study to assess comprehensive swallow function.     Oral Phase: Lip closure was adequate with no labial escape.  Tongue control/bolus hold was adequate. Bolus preparation and mastication was timely and efficient. Lingual motion was brisk for adequate bolus transport. There was no significant oral residue.. The swallow was initiated when the head of the bolus passed the ramus of the mandible.    Pharyngeal Phase:  The soft palate elevated for adequate closure of the velopharyngeal port. Tongue base retraction was adequate. Epiglottic inversion appeared to be complete. Anterior hyoid excursion was adequate. Laryngeal elevation was adequate. There was no penetration in this study.  There was no aspiration observed in this study.  Pharyngeal stripping wave appeared present and complete. The pharyngoesophageal segment opening was adequate  There was no significant pharyngeal residue.     Esophageal Phase: On esophageal screen, UES appeared to accommodate all bolus types without  stasis or retrograde movement observed       Dysphagia Outcome and Severity Scale (DENEEN): Level 7: Normal in all situations    DIGEST Score:  Safety Grade: Grade 0  Efficiency Grade: Grade 0    Impressions: Hawa Ortiz exhibited  functional oral and pharyngeal phases of swallowing.   Both swallow safety and swallow efficiency are preserved,. Patient appears to be at low risk for aspiration related pneumonia in consideration of three pillars of aspiration pneumonia* including good oral health status, overall health/immune status, and laryngeal vestibule closure/severity of dysphagia. Patient is not currently a candidate for behavioral swallow rehabilitation. SLP intervention is not warranted at  this time. Please free to re-consult as warranted.     *BARRIE Hubbard (2005, March). Pneumonia: Factors Beyond Aspiration. Perspectives in Swallowing and Swallowing Disorders (Dysphagia), 14, 10-16.    Goals:     No Long or short term goal warranted at this time.    Recommendations:     Consistency Recommendations:  Thin liquids (IDDSI 0) and easy to chew consistencies (IDDSI 6).   Precautions:alternate food and liquid and decrease bite/drink size  Risk Management: use good oral hygiene , sit upright for all PO intake, and increase physical mobility as tolerated  Specialist Referrals: NA  Ancillary Tests: Consider N/a.  Therapy: Dysphagia therapy is not recommended at this time.  Frequency: NA  Follow-up exam: Follow up swallow study is not indicated at this time.      The Functional Oral Intake Scale (FOIS) is an ordinal scale that is used to assess the current status and meaningful change in the oral intake. FOIS levels include:        TUBE DEPENDENT   (Levels 1-3) 1. No oral intake    2. Tube dependent with minimal/inconsistent oral intake    3. Tube supplements with consistent oral intake          TOTAL ORAL INTAKE (Levels 4-7) 4. Total oral intake of a single consistency    5. Total oral intake of multiple  consistencies requiring special preparation    6. Total oral intake with no special preparation, but must avoid specific foods or liquid items    7. Total oral intake with no restrictions   Patient is currently judged to be at FOIS Level 6      Please contact [facility] at [phone] if there are questions re: the above or if we can be of additional service to this patient.    Therapist's Name:   Danuta Cisneros MS, CCC-SLP  Speech-Language Pathologist    Date: 1/23/2023

## 2023-01-30 ENCOUNTER — OFFICE VISIT (OUTPATIENT)
Dept: FAMILY MEDICINE | Facility: CLINIC | Age: 48
End: 2023-01-30
Payer: MEDICAID

## 2023-01-30 VITALS
OXYGEN SATURATION: 100 % | SYSTOLIC BLOOD PRESSURE: 116 MMHG | WEIGHT: 176.56 LBS | HEART RATE: 72 BPM | DIASTOLIC BLOOD PRESSURE: 79 MMHG | HEIGHT: 66 IN | TEMPERATURE: 98 F | RESPIRATION RATE: 18 BRPM | BODY MASS INDEX: 28.38 KG/M2

## 2023-01-30 DIAGNOSIS — R05.3 PERSISTENT DRY COUGH: ICD-10-CM

## 2023-01-30 DIAGNOSIS — R21 RASH AND NONSPECIFIC SKIN ERUPTION: Primary | ICD-10-CM

## 2023-01-30 PROCEDURE — 99215 OFFICE O/P EST HI 40 MIN: CPT | Mod: PBBFAC

## 2023-01-30 RX ORDER — ALBUTEROL SULFATE 90 UG/1
2 AEROSOL, METERED RESPIRATORY (INHALATION) EVERY 6 HOURS PRN
Qty: 18 G | Refills: 1 | Status: SHIPPED | OUTPATIENT
Start: 2023-01-30

## 2023-01-30 RX ORDER — TRIAMCINOLONE ACETONIDE 1 MG/G
OINTMENT TOPICAL 2 TIMES DAILY
Qty: 80 G | Refills: 0 | Status: SHIPPED | OUTPATIENT
Start: 2023-01-30 | End: 2023-09-07

## 2023-01-30 RX ORDER — BENZONATATE 200 MG/1
200 CAPSULE ORAL 3 TIMES DAILY PRN
Qty: 20 CAPSULE | Refills: 0 | Status: SHIPPED | OUTPATIENT
Start: 2023-01-30 | End: 2023-02-09

## 2023-01-30 RX ORDER — CETIRIZINE HYDROCHLORIDE 10 MG/1
10 TABLET ORAL DAILY
Qty: 90 TABLET | Refills: 0 | Status: SHIPPED | OUTPATIENT
Start: 2023-01-30 | End: 2023-07-18 | Stop reason: SDUPTHER

## 2023-01-30 RX ORDER — FERROUS SULFATE TAB 325 MG (65 MG ELEMENTAL FE) 325 (65 FE) MG
TAB ORAL
COMMUNITY
Start: 2023-01-30 | End: 2023-01-30

## 2023-01-30 NOTE — PATIENT INSTRUCTIONS
Future Appointments   Date Time Provider Department Center   4/5/2023  2:00 PM CINDY Haque [facility] NEURO [facility]   5/30/2023 12:00 PM RESIDENT 2, [facility] OTORHINOLARYNGOLOGY [facility] ENT [facility] [place]

## 2023-01-30 NOTE — PROGRESS NOTES
Subjective:       Patient ID: Hawa Ortiz is a 48 y.o. female.    Chief Complaint: Rash    HPI  49 yo female presenting for 6 week follow-up. With complaints of skin rash on upper arm which started this morning and nonproductive cough.     Rash is itchy and painful. Denies any insect bites. No recent changes to soap or detergents. No other affected body parts.    States cough has been present since last visit 12/2022. Given Zyrtec but ran out. Has tried cough drops with no relief.       HM: Still due for colonoscopy  Last pap 2020. NILM.    Review of Systems   Constitutional:  Negative for chills and fever.   HENT:  Positive for postnasal drip and sore throat.    Respiratory:  Positive for shortness of breath. Negative for wheezing.    Cardiovascular:  Negative for chest pain.   Gastrointestinal:  Negative for nausea and vomiting.   Skin:  Positive for rash.   Neurological:  Negative for light-headedness.     Objective:      Vitals:    01/30/23 1246   BP: 116/79   Pulse: 72   Resp: 18   Temp: 97.9 °F (36.6 °C)       Physical Exam   Skin: Rash noted. No abrasion, no ecchymosis and no abscess noted.   10cm bi-segment patch to upper right arm. No bleeding or discharge. No papules or vesicles.         Assessment:       1. Rash and nonspecific skin eruption    2. Persistent dry cough        Plan:       Triamcinolone ointment. Advised to stop scratching. Will call at week's end to follow-up.  2. Has history of asthma. Not using albuterol inhaler. Has one at home. Advised to check expiration date and will prescribe new one.   -Consider repeat PFTs.  - GERD and previous episodes of Upper Airway Cough Syndrome.   -Tessalon sent to patient's pharmacy.        Archie Chen MD, MPH  [employer]  [facility]

## 2023-02-17 ENCOUNTER — CLINICAL SUPPORT (OUTPATIENT)
Dept: FAMILY MEDICINE | Facility: CLINIC | Age: 48
End: 2023-02-17
Payer: MEDICAID

## 2023-02-17 VITALS — BODY MASS INDEX: 29.09 KG/M2 | WEIGHT: 181 LBS | HEIGHT: 66 IN

## 2023-02-17 DIAGNOSIS — Z30.09 UNWANTED FERTILITY: Primary | ICD-10-CM

## 2023-02-17 LAB
B-HCG UR QL: NEGATIVE
CTP QC/QA: YES

## 2023-02-17 PROCEDURE — 81025 URINE PREGNANCY TEST: CPT | Mod: PBBFAC

## 2023-02-17 PROCEDURE — 96372 THER/PROPH/DIAG INJ SC/IM: CPT | Mod: PBBFAC

## 2023-02-17 PROCEDURE — 99212 OFFICE O/P EST SF 10 MIN: CPT | Mod: PBBFAC,25

## 2023-02-17 RX ORDER — MEDROXYPROGESTERONE ACETATE 150 MG/ML
150 INJECTION, SUSPENSION INTRAMUSCULAR
Status: COMPLETED | OUTPATIENT
Start: 2023-02-17 | End: 2023-02-17

## 2023-02-17 RX ADMIN — MEDROXYPROGESTERONE ACETATE 150 MG: 150 INJECTION, SUSPENSION, EXTENDED RELEASE INTRAMUSCULAR at 11:02

## 2023-02-17 NOTE — PROGRESS NOTES
Pt was here for Depo shot. Pregancy test was negative. Tolerated injection well no sign of reaction or bleeding at injection site. Pt had follow up appointment scheduled.

## 2023-04-05 ENCOUNTER — OFFICE VISIT (OUTPATIENT)
Dept: NEUROLOGY | Facility: CLINIC | Age: 48
End: 2023-04-05
Payer: MEDICAID

## 2023-04-05 VITALS
HEIGHT: 66 IN | WEIGHT: 184 LBS | BODY MASS INDEX: 29.57 KG/M2 | SYSTOLIC BLOOD PRESSURE: 115 MMHG | OXYGEN SATURATION: 100 % | TEMPERATURE: 99 F | HEART RATE: 62 BPM | DIASTOLIC BLOOD PRESSURE: 81 MMHG

## 2023-04-05 DIAGNOSIS — G43.711 CHRONIC MIGRAINE WITHOUT AURA, INTRACTABLE, WITH STATUS MIGRAINOSUS: Primary | ICD-10-CM

## 2023-04-05 DIAGNOSIS — R52 PAIN: ICD-10-CM

## 2023-04-05 PROCEDURE — 3079F DIAST BP 80-89 MM HG: CPT | Mod: CPTII,,, | Performed by: NURSE PRACTITIONER

## 2023-04-05 PROCEDURE — 3079F PR MOST RECENT DIASTOLIC BLOOD PRESSURE 80-89 MM HG: ICD-10-PCS | Mod: CPTII,,, | Performed by: NURSE PRACTITIONER

## 2023-04-05 PROCEDURE — 3074F PR MOST RECENT SYSTOLIC BLOOD PRESSURE < 130 MM HG: ICD-10-PCS | Mod: CPTII,,, | Performed by: NURSE PRACTITIONER

## 2023-04-05 PROCEDURE — 99214 PR OFFICE/OUTPT VISIT, EST, LEVL IV, 30-39 MIN: ICD-10-PCS | Mod: S$PBB,,, | Performed by: NURSE PRACTITIONER

## 2023-04-05 PROCEDURE — 99214 OFFICE O/P EST MOD 30 MIN: CPT | Mod: S$PBB,,, | Performed by: NURSE PRACTITIONER

## 2023-04-05 PROCEDURE — 99214 OFFICE O/P EST MOD 30 MIN: CPT | Mod: PBBFAC | Performed by: NURSE PRACTITIONER

## 2023-04-05 PROCEDURE — 3008F BODY MASS INDEX DOCD: CPT | Mod: CPTII,,, | Performed by: NURSE PRACTITIONER

## 2023-04-05 PROCEDURE — 1160F RVW MEDS BY RX/DR IN RCRD: CPT | Mod: CPTII,,, | Performed by: NURSE PRACTITIONER

## 2023-04-05 PROCEDURE — 1160F PR REVIEW ALL MEDS BY PRESCRIBER/CLIN PHARMACIST DOCUMENTED: ICD-10-PCS | Mod: CPTII,,, | Performed by: NURSE PRACTITIONER

## 2023-04-05 PROCEDURE — 1159F PR MEDICATION LIST DOCUMENTED IN MEDICAL RECORD: ICD-10-PCS | Mod: CPTII,,, | Performed by: NURSE PRACTITIONER

## 2023-04-05 PROCEDURE — 1159F MED LIST DOCD IN RCRD: CPT | Mod: CPTII,,, | Performed by: NURSE PRACTITIONER

## 2023-04-05 PROCEDURE — 3074F SYST BP LT 130 MM HG: CPT | Mod: CPTII,,, | Performed by: NURSE PRACTITIONER

## 2023-04-05 PROCEDURE — 3008F PR BODY MASS INDEX (BMI) DOCUMENTED: ICD-10-PCS | Mod: CPTII,,, | Performed by: NURSE PRACTITIONER

## 2023-04-05 RX ORDER — RIZATRIPTAN BENZOATE 10 MG/1
10 TABLET ORAL 2 TIMES DAILY PRN
Qty: 12 TABLET | Refills: 4 | Status: SHIPPED | OUTPATIENT
Start: 2023-04-05 | End: 2023-08-16

## 2023-04-05 RX ORDER — TOPIRAMATE 100 MG/1
100 TABLET, FILM COATED ORAL 2 TIMES DAILY
Qty: 180 TABLET | Refills: 3 | Status: SHIPPED | OUTPATIENT
Start: 2023-04-05 | End: 2023-09-07 | Stop reason: SDUPTHER

## 2023-04-05 RX ORDER — GABAPENTIN 300 MG/1
300 CAPSULE ORAL 3 TIMES DAILY
Qty: 90 CAPSULE | Refills: 6 | Status: SHIPPED | OUTPATIENT
Start: 2023-04-05 | End: 2023-07-18 | Stop reason: SDUPTHER

## 2023-04-05 NOTE — PROGRESS NOTES
"[facility] Neurology Follow Up Visit Note  Subjective:       Patient ID: Hawa Ortiz is a 48 y.o. female.    Chief Complaint: Migraine (Pt reports about the same)    HPI  This is a 47 year old Right Handed F with PMHX of HTN, GERD, mild intellectual delay, cataract sx 1/2022 and 3/2022, who was referred for headache disorder. Patient was last seen on 1/5/2023. During that visit, gabapentin was increased to 300mg TID, TPM 100mg BID was continued and rizatriptan 10mg BID PRN was initiated.    Today, Pt states migraines have improved from 4/week to 2/week. Rizatriptan effective as abortive therapy. Lives with people who smoke. Cannot take NSAIDS as she states she has GI issues. No longer taking cyclobenzaprine PRN. Is not sleeping well at night as her daughter and granddaughter live with her. Recent injury to R lateral side that radiates to distal lower ext, states she "turned" the wrong way and heard a "pop".Occurred 3 weeks ago. Has not seen PCP.    Age of Onset - childhood    Semiology -  frontal, dull, headache, w/o nausea, w/ photophobia, lasting around all day - 30 headache days per month    Frontal, pounding, headache, w/ nausea, w/ photophobia, w/ phonophobia - 0 migraine headache days per month.    Frequency - 28 headaches/month w/4 migraine headache days per month.    Exacerbating Factors - none    Risk Factors -  - Family history of headache disorder? denied  - History of Head Trauma? unable to recall  - History of CNS infection? denied  - History of underlying mood disorder? + mood swing  - Illicit drug use? denied  - Tobacco use? denied  - History of sleep disorder? Repeat PSG 12/21/2020 - snoring     Workup -  - MRI Brain +/- Jason 4/2019 - I have reviewed the study independently and with the patient. Unremarkable MRI Brain.    Current ppx meds -  TPM 100mg BID (2/25/2021- present) - denied any renal stones.  Gabapentin 300mg BID (9/29/2021 - present)    Current abortive meds -  Tylenol 2 tabs PRN - " intermittently effective  Rizatriptan 10mg PO BID PRN (1/5/2023 - present) - effective    Prior ppx meds -  Effexor 37.5mg daily (7/1/2019 - 9/18/2019) - ineffective  Nortriptyline 25mg qhs (5/2019-6/1/2019) - ineffective  Elavil 25mg qhs (10/1/2018 - 10/28/2018) - ineffective    Prior abortive meds -   Fioricet prn -   Baclofen BID prn    Review of Systems   Constitutional: no fever, + fatigue, weakness  Eye: no vision loss, eye redness, drainage, or pain  ENMT: no sore throat, ear pain, sinus pain/congestion, nasal congestion/drainage  Respiratory: no cough, no wheezing, no shortness of breath  Cardiovascular: no chest pain, no palpitations, no edema  Gastrointestinal: no nausea, vomiting, or diarrhea. No abdominal pain  Genitourinary: no dysuria, no urinary frequency or urgency, no hematuria  Hema/Lymph: no abnormal bruising or bleeding  Endocrine: no heat or cold intolerance, no excessive thirst or excessive urination  Musculoskeletal: + muscle or joint pain, no joint swelling  Integumentary: no skin rash or abnormal lesion  Psychiatric: + depressed mood,+ anxiety, no visual/auditory hallucinations, no delusions, no suicidal or homicidal ideation  Neurologic: as per HPI    Objective:      Physical Exam    General: well-developed well-nourished in no acute distress  Eye: clear conjunctiva, eyelids normal  HENT: TMs/ear canals clear, oropharynx without erythema/exudate, oropharynx and nasal mucosal surfaces moist, no maxillary/frontal sinus tenderness to palpation  Neck: full range of motion, no thyromegaly or lymphadenopathy  Respiratory: clear to auscultation bilaterally  Cardiovascular: regular rate and rhythm without murmurs, gallops or rubs  Gastrointestinal: soft, non-tender, non-distended with normal bowel sounds, without masses to palpation  Musculoskeletal: full range of motion of all extremities/spine without limitation or discomfort  Integumentary: no rashes or skin lesions  present    Neurologic:  Mental Status- Alert and Oriented to time, self, place, Speech is fluent, with intact repetition, naming, reading, and comprehension., No Dysarthria.  CN II-XII - CN I Deferred, ROSELINE, no RAPD, VA grossly normal to finger counting at 6ft, No ptosis b/l, EOMI w/o nystagmus, LT/PP symmetric, intact in CN V1-V3 distribution b/l, masseter symmetric b/l, T/U midline, Shoulder shrug symmetric b/l, Hearing grossly intact b/l, VFFC, Face Symmetric.  Motor - Tone and Bulk nml throughout, No involuntary movements, 5/5 to confrontation throughout, FFM nml b/l, No pronator drift. Pain to touch R lateral rib area, difficulty lifting R arm over head  Sensory - LT/PP/Temp/Proprioception/Vibration symmetric intact throughout.  Reflexes - 2+ Throughout  Cerebellar Exam - FNF wnl b/l no intention tremor.  Romberg - negative.  Gait - Normal, bilat arm swing intact    Assessment:       This is a 48 year old Right Handed F with PMHX of HTN, GERD, JESU, mild intellectual delay, who was referred for headache. PSG showed snoring. Migraine improved from 4/month to 2/month. Rizatriptan effective as abortive therapy. Discussed not being exposed second hand smoke as it could exacerbate HA. Cannot take NSAIDS. New injury to R lateral side. Recommended ice packs and f/u w/PCP.    1. Chronic migraine without aura, intractable, with status migrainosus  - gabapentin (NEURONTIN) 300 MG capsule; Take 1 capsule (300 mg total) by mouth 3 (three) times daily.  Dispense: 90 capsule; Refill: 6  - rizatriptan (MAXALT) 10 MG tablet; Take 1 tablet (10 mg total) by mouth 2 (two) times daily as needed for Migraine.  Dispense: 12 tablet; Refill: 4  - topiramate (TOPAMAX) 100 MG tablet; Take 1 tablet (100 mg total) by mouth 2 (two) times daily.  Dispense: 180 tablet; Refill: 3    2. Headache    3. Pain        Plan:       [] c/w TPM 100mg BID   [] c/w Neurontin 300mg to TID  [] c/w rizatriptan 10mg PO BID PRN migraine  [] f/u with PCP  regarding back pain    RTC 6 months    Headache education provided - including good sleep hygiene, stress management, medication overuse education provided - using more 3 OTC per week may worsen headaches, High intensity interval training has shown to reduce headache frequency. Low carb, high protein has shown to reduce headache frequency. Patient is instructed to keep headache diary    I have explained the treatment plan, diagnosis, and prognosis to the patient, caretaker, family. All questions are answered to the best of my knowledge.    Face to Face Time 30 minute, including, counseling, education, review of test results, relevant medical records, and coordination of care.

## 2023-04-06 ENCOUNTER — DOCUMENTATION ONLY (OUTPATIENT)
Dept: ADMINISTRATIVE | Facility: HOSPITAL | Age: 48
End: 2023-04-06
Payer: MEDICAID

## 2023-05-01 ENCOUNTER — OFFICE VISIT (OUTPATIENT)
Dept: FAMILY MEDICINE | Facility: CLINIC | Age: 48
End: 2023-05-01
Payer: MEDICAID

## 2023-05-01 ENCOUNTER — HOSPITAL ENCOUNTER (OUTPATIENT)
Dept: RADIOLOGY | Facility: HOSPITAL | Age: 48
Discharge: HOME | End: 2023-05-01
Attending: STUDENT IN AN ORGANIZED HEALTH CARE EDUCATION/TRAINING PROGRAM
Payer: MEDICAID

## 2023-05-01 VITALS
SYSTOLIC BLOOD PRESSURE: 113 MMHG | TEMPERATURE: 98 F | HEART RATE: 65 BPM | WEIGHT: 195.63 LBS | HEIGHT: 66 IN | BODY MASS INDEX: 31.44 KG/M2 | OXYGEN SATURATION: 100 % | RESPIRATION RATE: 18 BRPM | DIASTOLIC BLOOD PRESSURE: 76 MMHG

## 2023-05-01 DIAGNOSIS — Z12.11 COLON CANCER SCREENING: ICD-10-CM

## 2023-05-01 DIAGNOSIS — M54.50 LUMBAR PAIN: ICD-10-CM

## 2023-05-01 DIAGNOSIS — Z30.9 ENCOUNTER FOR CONTRACEPTIVE MANAGEMENT, UNSPECIFIED TYPE: ICD-10-CM

## 2023-05-01 DIAGNOSIS — Z12.31 BREAST CANCER SCREENING BY MAMMOGRAM: ICD-10-CM

## 2023-05-01 DIAGNOSIS — M25.532 WRIST PAIN, ACUTE, LEFT: Primary | ICD-10-CM

## 2023-05-01 DIAGNOSIS — M25.532 WRIST PAIN, ACUTE, LEFT: ICD-10-CM

## 2023-05-01 LAB
B-HCG UR QL: NEGATIVE
CTP QC/QA: YES

## 2023-05-01 PROCEDURE — 73130 X-RAY EXAM OF HAND: CPT | Mod: TC,LT

## 2023-05-01 PROCEDURE — 81025 URINE PREGNANCY TEST: CPT | Mod: PBBFAC

## 2023-05-01 PROCEDURE — 99215 OFFICE O/P EST HI 40 MIN: CPT | Mod: PBBFAC

## 2023-05-01 PROCEDURE — 96372 THER/PROPH/DIAG INJ SC/IM: CPT | Mod: PBBFAC

## 2023-05-01 RX ORDER — LURASIDONE HYDROCHLORIDE 60 MG/1
60 TABLET, FILM COATED ORAL DAILY
Qty: 90 TABLET | Refills: 1 | Status: SHIPPED | OUTPATIENT
Start: 2023-05-01 | End: 2023-08-15

## 2023-05-01 RX ORDER — PREDNISONE 20 MG/1
20 TABLET ORAL DAILY
Qty: 5 TABLET | Refills: 0 | Status: SHIPPED | OUTPATIENT
Start: 2023-05-01 | End: 2023-07-18

## 2023-05-01 RX ORDER — FERROUS SULFATE TAB 325 MG (65 MG ELEMENTAL FE) 325 (65 FE) MG
TAB ORAL
COMMUNITY
Start: 2023-04-24 | End: 2023-05-01

## 2023-05-01 RX ORDER — MEDROXYPROGESTERONE ACETATE 150 MG/ML
150 INJECTION, SUSPENSION INTRAMUSCULAR
Status: DISCONTINUED | OUTPATIENT
Start: 2023-05-01 | End: 2023-12-07

## 2023-05-01 RX ORDER — BACLOFEN 5 MG/1
5 TABLET ORAL 4 TIMES DAILY
Qty: 12 TABLET | Refills: 0 | Status: SHIPPED | OUTPATIENT
Start: 2023-05-01 | End: 2023-05-04

## 2023-05-01 RX ADMIN — MEDROXYPROGESTERONE ACETATE 150 MG: 150 INJECTION, SUSPENSION, EXTENDED RELEASE INTRAMUSCULAR at 01:05

## 2023-05-01 NOTE — PROGRESS NOTES
Subjective:       Patient ID: Hawa Ortiz is a 48 y.o. female.    Chief Complaint: 3 month follow up  (Right side pain and left hand pain)    HPI  47 yo female presenting for 6 week follow-up. With complaints of chronic low back pain and left wrist/hand pain.     States she injured left hand one day ago while lifting heavy objects in yard. Denies anything falling on hand. No discoloration. Able to move all fingers. Pain with wrist flexion and extension.     Right low back pain is chronic. Felt a 'strain' when lifting for an item overhead. Has been using 1/2 tab of acetaminophen as needed. Unsure of dosage.    Also returns today for 3 month Depo Provera.        HM: Still due for colonoscopy. Wasn't called by GI despite previous referral.  Last pap 2020. NILM.  Mammo due.    Review of Systems   Constitutional:  Negative for appetite change.   Musculoskeletal:  Negative for arthralgias, gait problem and joint swelling.     Objective:      Vitals:    05/01/23 1256   BP: 113/76   Pulse: 65   Resp: 18   Temp: 97.7 °F (36.5 °C)       Physical Exam   Musculoskeletal:      Right hand: Normal.      Left hand: Swelling, tenderness and bony tenderness present. No deformity or lacerations. Decreased range of motion. Normal strength. Normal sensation.      Lumbar back: Tenderness present. Decreased range of motion.      Comments: Right lateral lumbar tenderness. Right ribs nontender.  Decreased ROM with lateral rotation and flexion.  Ambulates without difficulty.   Neurological: She is alert.     Assessment:       1. Wrist pain, acute, left    2. Lumbar pain - exacerbation of chronic condition   3. Encounter for contraceptive management, unspecified type    4. Breast cancer screening by mammogram      5.    Colon cancer screening  Plan:       Ordered xray of left hand/wrist. Short course of Prednisone 20mg x 5 days. Keep wrist splinted for no greater than one week. Pain medication as needed.  Not at goal. Advised to increase  acetaminophen dosage. No more than 3000mg/day. Voltaren gel, heating pad as needed. Remain active as tolerated.  UPT negative. Received depo today. Return in 3 months for next injection.  Mammogram ordered.  Resubmitted GI referral for colonoscopy.      RTC in 3 months for next Depo or sooner if needed.    Archei Chen MD, MPH  [employer]  [facility]

## 2023-05-30 ENCOUNTER — HOSPITAL ENCOUNTER (OUTPATIENT)
Dept: RADIOLOGY | Facility: HOSPITAL | Age: 48
Discharge: HOME | End: 2023-05-30
Attending: STUDENT IN AN ORGANIZED HEALTH CARE EDUCATION/TRAINING PROGRAM
Payer: MEDICAID

## 2023-05-30 ENCOUNTER — OFFICE VISIT (OUTPATIENT)
Dept: OTOLARYNGOLOGY | Facility: CLINIC | Age: 48
End: 2023-05-30
Payer: MEDICAID

## 2023-05-30 VITALS
WEIGHT: 195 LBS | DIASTOLIC BLOOD PRESSURE: 82 MMHG | SYSTOLIC BLOOD PRESSURE: 124 MMHG | HEART RATE: 65 BPM | BODY MASS INDEX: 31.47 KG/M2

## 2023-05-30 DIAGNOSIS — Z12.31 BREAST CANCER SCREENING BY MAMMOGRAM: ICD-10-CM

## 2023-05-30 DIAGNOSIS — J30.9 ALLERGIC RHINITIS, UNSPECIFIED SEASONALITY, UNSPECIFIED TRIGGER: Primary | ICD-10-CM

## 2023-05-30 DIAGNOSIS — R13.10 DYSPHAGIA, UNSPECIFIED TYPE: ICD-10-CM

## 2023-05-30 DIAGNOSIS — E04.2 MULTIPLE THYROID NODULES: ICD-10-CM

## 2023-05-30 PROCEDURE — 77063 MAMMO DIGITAL SCREENING BILAT WITH TOMO: ICD-10-PCS | Mod: 26,,, | Performed by: RADIOLOGY

## 2023-05-30 PROCEDURE — 77063 BREAST TOMOSYNTHESIS BI: CPT | Mod: 26,,, | Performed by: RADIOLOGY

## 2023-05-30 PROCEDURE — 77067 MAMMO DIGITAL SCREENING BILAT WITH TOMO: ICD-10-PCS | Mod: 26,,, | Performed by: RADIOLOGY

## 2023-05-30 PROCEDURE — 77067 SCR MAMMO BI INCL CAD: CPT | Mod: TC

## 2023-05-30 PROCEDURE — 77067 SCR MAMMO BI INCL CAD: CPT | Mod: 26,,, | Performed by: RADIOLOGY

## 2023-05-30 PROCEDURE — 99213 OFFICE O/P EST LOW 20 MIN: CPT | Mod: PBBFAC | Performed by: STUDENT IN AN ORGANIZED HEALTH CARE EDUCATION/TRAINING PROGRAM

## 2023-05-30 NOTE — PROGRESS NOTES
[facility]  Otolaryngology Clinic Note    Hawa Ortiz  Encounter Date: 5/30/2023  YOB: 1975  Physician: Walter Aguirre MD    Chief Complaint: dysphagia    HPI: Hawa Ortiz is a 48 y.o. female sent by her primary care physician for persistent dysphagia, sore throat particularly to water, difficulty with swallowing meats, intermittent hoarseness, postnasal drip, itchy eyes and nose, anterior rhinorrhea, sneezing, anterior neck discomfort.  She reports this has been as long as she can recall.  She had a thyroid ultrasound which had demonstrated thyroid nodules not meeting FNA criteria.  Her throat discomfort and nonproductive cough are worse at night prior to going to bed.  She frequently feels like solids gets stuck in her throat and require great effort to push down.    8/25/22: Here today for follow up. Continues to endorse throat pain that is worse at night.  She states she does have reflux symptoms including burning chest pain and acid taste in her mouth. Occasionally loses her voice.  She does not take any medication for this. We discussed foods to avoid however she states where she lives, other people buy the food and she has no control over it. Unfortunately she eats a lot of friend chicken and pizza.  Has been using flonase and astelin but does not feel this has helped much with her nasal symptoms.     11/29/22:  Returns for follow up.  Obtained RAST testing today, results not back yet.  Reports using flonase and astelin.  Reports she has dysphagia to meats only.  No problems with liquids.  Has not seen GI yet.  Reports she's had EGDs done before with last one being around 2000/2001.    5/30/23: Here today for follow up. She reports that she has not yet her back from GI, despite two referrals, placed both by us and internal medicine. She has not tried to call their clinic yet. She continues to have issues swallowing meats in particular and when asked about her  anxiety to eating which was noted in the SLP report, she became tearful. She reports being very fearful of not being able to swallow which gives her a lot of stress. She sees [facility] and reports being on medication for her anxiety. As far as the allergies go, she reports persistent sneezing and runny nose. She is not currently taking any allergy medicines because she says she gets overwhelmed by all of the options.     ROS:   General: Negative except per HPI  Skin: Denies rash, ulcer, or lesion.  Eyes: Denies vision changes or diplopia.  Ears: Negative except per HPI  Nose: Negative except per HPI  Throat/mouth: Negative except per HPI  Cardiovascular: Negative except per HPI  Respiratory: Negative except per HPI  Neck: Negative except per HPI  Endocrine: Negative except per HPI  Neurologic: Negative except per HPI    Other 10-point review of systems negative except per HPI      Review of patient's allergies indicates:  No Known Allergies    Past Medical History:   Diagnosis Date    Arthritis     Borderline diabetes     Migraine        Past Surgical History:   Procedure Laterality Date    CHOLECYSTECTOMY      EYE SURGERY Bilateral        Social History     Socioeconomic History    Marital status: Single   Tobacco Use    Smoking status: Former     Types: Cigarettes    Smokeless tobacco: Never   Substance and Sexual Activity    Alcohol use: Not Currently    Drug use: Never    Sexual activity: Not Currently       Family History   Problem Relation Age of Onset    Heart disease Mother     Aneurysm Neg Hx     Cancer Neg Hx     Clotting disorder Neg Hx     Dementia Neg Hx     Diabetes Neg Hx     Fainting Neg Hx     Hyperlipidemia Neg Hx     Kidney disease Neg Hx     Liver disease Neg Hx     Migraines Neg Hx     Neuropathy Neg Hx     Obesity Neg Hx     Parkinsonism Neg Hx     Seizures Neg Hx     Stroke Neg Hx     Tremor Neg Hx        Outpatient Encounter Medications as of 5/30/2023   Medication Sig Dispense  Refill    albuterol (VENTOLIN HFA) 90 mcg/actuation inhaler Inhale 2 puffs into the lungs every 6 (six) hours as needed for Wheezing. Rescue 18 g 1    azelastine (ASTELIN) 137 mcg (0.1 %) nasal spray 1 spray (137 mcg total) by Nasal route 2 (two) times daily. 30 mL 5    [] baclofen (LIORESAL) 5 mg Tab tablet Take 1 tablet (5 mg total) by mouth 4 (four) times daily. Use intermittently with Acetaminophen. for 3 days 12 tablet 0    cetirizine (ZYRTEC) 10 MG tablet Take 1 tablet (10 mg total) by mouth once daily. 90 tablet 0    EScitalopram oxalate (LEXAPRO) 20 MG tablet Take 1 tablet (20 mg total) by mouth once daily. 30 tablet 11    famotidine (PEPCID) 20 MG tablet Take 1 tablet (20 mg total) by mouth daily as needed for Heartburn. 30 tablet 6    ferrous sulfate 325 (65 FE) MG EC tablet Take 1 tablet (325 mg total) by mouth once daily. 90 tablet 3    fluticasone propionate (FLONASE) 50 mcg/actuation nasal spray 1 spray (50 mcg total) by Each Nostril route 2 (two) times a day. 15.8 mL 12    gabapentin (NEURONTIN) 300 MG capsule Take 1 capsule (300 mg total) by mouth 3 (three) times daily. 90 capsule 6    LATUDA 60 mg Tab tablet Take 1 tablet (60 mg total) by mouth once daily. 90 tablet 1    pantoprazole (PROTONIX) 40 MG tablet Take 1 tablet (40 mg total) by mouth once daily. 30 tablet 11    predniSONE (DELTASONE) 20 MG tablet Take 1 tablet (20 mg total) by mouth once daily. 5 tablet 0    rizatriptan (MAXALT) 10 MG tablet Take 1 tablet (10 mg total) by mouth 2 (two) times daily as needed for Migraine. 12 tablet 4    sodium chloride (SALINE MIST) 0.65 % nasal spray 1 spray by Nasal route 2 (two) times a day. 15 mL 12    topiramate (TOPAMAX) 100 MG tablet Take 1 tablet (100 mg total) by mouth 2 (two) times daily. 180 tablet 3    triamcinolone acetonide 0.1% (KENALOG) 0.1 % ointment Apply topically 2 (two) times daily. 80 g 0     Facility-Administered Encounter Medications as of 2023   Medication Dose Route  Frequency Provider Last Rate Last Admin    medroxyPROGESTERone (DEPO-PROVERA) injection 150 mg  150 mg Intramuscular Q90 Days Archie Chen MD   150 mg at 05/01/23 1343    medroxyPROGESTERone (DEPO-PROVERA) syringe 150 mg  150 mg Intramuscular Q90 Days Archie Chen MD   150 mg at 11/17/22 1223       Physical Exam:  Vitals:    05/30/23 1221   BP: 124/82   Pulse: 65   Weight: 88.5 kg (195 lb)       Constitutional  General Appearance: well nourished, well-developed, AAO x3, NAD  HEENT  Eyes: PEERLA, EOMI, normal conjunctivae  Ears: Hearing well at conversation level   AD: auricle normal, EAC normal, TM intact, no RAFA   AS: auricle normal, EAC normal, TM intact, no RAFA  Nose: septum midline, no inferior turbinate hypertrophy, no polyps, moist mucosa  OC/OP: dentition poor, no oral lesions, tongue/FOM/BOT- soft, no leukoplakia/ulcerations/ tenderness  Neck: soft, non-tender, no palpable lymph nodes   Thyroid region- no nodules or goiter  Neuro: CN II - XII intact bilaterally  Cardiovascular: peripheral pulses palpable  Respiratory: non-labored respirations  Psychiatric: oriented to time, place and person, no depression, anxiety or agitation      Assessment/Plan:  Hawa Ortiz is a 48 y.o. female with LPRD/GERD, allergic rhinitis. Has subcentimeter right thyroid nodules, nearly 2 cm cystic/solid nodule of left thyroid. Recently underwent MBSS which was normal but they did note multiple refusals to swallow due to fear/anxiety of swallowing.   - I encouraged her to still follow up with GI for further workup/EGD. She has not heard back from them about the referrals. I provided her with the phone number to their clinic so that she can reach out about her referral.   - I encouraged her to use zyrtec and flonase for the allergies.   - I also encouraged her to speak with her mental health doctors about her anxieties towards eating as that seems to be playing into this as well.   - Repeat US to evaluate the larger 2cm  solid/cystic nodule. Schedule telemed appt in 6 weeks to follow up the results and determine if she needs an FNA.       Kate Ortiz, PGY-4  [facility]

## 2023-05-31 DIAGNOSIS — D64.9 CHRONIC ANEMIA: Primary | ICD-10-CM

## 2023-06-05 ENCOUNTER — HOSPITAL ENCOUNTER (OUTPATIENT)
Dept: RADIOLOGY | Facility: HOSPITAL | Age: 48
Discharge: HOME | End: 2023-06-05
Attending: STUDENT IN AN ORGANIZED HEALTH CARE EDUCATION/TRAINING PROGRAM
Payer: MEDICAID

## 2023-06-05 DIAGNOSIS — E04.2 MULTIPLE THYROID NODULES: ICD-10-CM

## 2023-06-05 PROCEDURE — 76536 US EXAM OF HEAD AND NECK: CPT | Mod: TC

## 2023-06-09 ENCOUNTER — LAB VISIT (OUTPATIENT)
Dept: LAB | Facility: HOSPITAL | Age: 48
End: 2023-06-09
Attending: NURSE PRACTITIONER
Payer: MEDICAID

## 2023-06-09 DIAGNOSIS — Z79.899 ENCOUNTER FOR LONG-TERM (CURRENT) USE OF OTHER MEDICATIONS: Primary | ICD-10-CM

## 2023-06-09 DIAGNOSIS — F25.1 SCHIZOAFFECTIVE DISORDER, DEPRESSIVE TYPE: ICD-10-CM

## 2023-06-09 LAB — PROLACTIN LEVEL (OHS): 39.03 NG/ML (ref 5.18–26.53)

## 2023-06-09 PROCEDURE — 84146 ASSAY OF PROLACTIN: CPT

## 2023-06-09 PROCEDURE — 36415 COLL VENOUS BLD VENIPUNCTURE: CPT

## 2023-06-21 ENCOUNTER — PATIENT MESSAGE (OUTPATIENT)
Dept: ADMINISTRATIVE | Facility: HOSPITAL | Age: 48
End: 2023-06-21
Payer: MEDICAID

## 2023-07-10 ENCOUNTER — PATIENT MESSAGE (OUTPATIENT)
Dept: ADMINISTRATIVE | Facility: HOSPITAL | Age: 48
End: 2023-07-10
Payer: MEDICAID

## 2023-07-18 ENCOUNTER — OFFICE VISIT (OUTPATIENT)
Dept: FAMILY MEDICINE | Facility: CLINIC | Age: 48
End: 2023-07-18
Payer: MEDICAID

## 2023-07-18 VITALS
SYSTOLIC BLOOD PRESSURE: 126 MMHG | HEIGHT: 66 IN | WEIGHT: 201.19 LBS | RESPIRATION RATE: 18 BRPM | HEART RATE: 61 BPM | TEMPERATURE: 98 F | DIASTOLIC BLOOD PRESSURE: 78 MMHG | BODY MASS INDEX: 32.33 KG/M2 | OXYGEN SATURATION: 100 %

## 2023-07-18 DIAGNOSIS — M25.532 LEFT WRIST PAIN: Primary | ICD-10-CM

## 2023-07-18 DIAGNOSIS — G43.711 CHRONIC MIGRAINE WITHOUT AURA, INTRACTABLE, WITH STATUS MIGRAINOSUS: ICD-10-CM

## 2023-07-18 DIAGNOSIS — J30.9 ALLERGIC RHINITIS, UNSPECIFIED SEASONALITY, UNSPECIFIED TRIGGER: ICD-10-CM

## 2023-07-18 PROCEDURE — 99215 OFFICE O/P EST HI 40 MIN: CPT | Mod: PBBFAC | Performed by: STUDENT IN AN ORGANIZED HEALTH CARE EDUCATION/TRAINING PROGRAM

## 2023-07-18 RX ORDER — DICLOFENAC SODIUM 10 MG/G
2 GEL TOPICAL 4 TIMES DAILY PRN
Qty: 100 G | Refills: 3 | Status: SHIPPED | OUTPATIENT
Start: 2023-07-18 | End: 2023-11-15

## 2023-07-18 RX ORDER — GABAPENTIN 300 MG/1
300 CAPSULE ORAL 3 TIMES DAILY
Qty: 90 CAPSULE | Refills: 6 | Status: SHIPPED | OUTPATIENT
Start: 2023-07-18 | End: 2023-09-07 | Stop reason: SDUPTHER

## 2023-07-18 RX ORDER — FERROUS SULFATE TAB 325 MG (65 MG ELEMENTAL FE) 325 (65 FE) MG
TAB ORAL
COMMUNITY
Start: 2023-05-26 | End: 2023-09-07

## 2023-07-18 RX ORDER — CETIRIZINE HYDROCHLORIDE 10 MG/1
10 TABLET ORAL DAILY
Qty: 90 TABLET | Refills: 0 | Status: SHIPPED | OUTPATIENT
Start: 2023-07-18 | End: 2023-08-15 | Stop reason: SDUPTHER

## 2023-07-18 NOTE — PROGRESS NOTES
Subjective:       Patient ID: Hawa Ortiz is a 48 y.o. female.    Chief Complaint: Wrist Pain (Left ( follow up)) and Medication Refill    HPI  47 yo F presents for f/u of her left wrist pain following sprain from heavy lifting. Pain located at distal ulna. Some swelling and decreased ROM. Has been using bracing and as needed Tylenol. Tried Prednisone 20mg x 5 days with relief of symptoms. Has not tried injections or PT.    Xrays negative for any fractures or dislocations.     Review of Systems    Negative except as above.  Objective:     Vitals:    07/18/23 1329   BP: 126/78   Pulse: 61   Resp: 18   Temp: 98 °F (36.7 °C)       Physical Exam  Musculoskeletal:      Right hand: Normal.      Comments: Swelling to distal forearm. Tender at midforearm and ulnar head. No deformities, lesions or lacerations. Pain with wrist flexion and extension (passive and active).       Assessment:       1. Left wrist pain    2. Chronic migraine without aura, intractable, with status migrainosus    3. Allergic rhinitis, unspecified seasonality, unspecified trigger        Plan:       Added topical voltaren. PT script and wrist exercises given to patient. May consider second course of steroids po if no improvement.    2,3. Refilled medications.    RTC in one month for repeat Depo. Does not need to be with PCP.    Archie Chen MD, MPH  [facility]

## 2023-08-15 ENCOUNTER — OFFICE VISIT (OUTPATIENT)
Dept: FAMILY MEDICINE | Facility: CLINIC | Age: 48
End: 2023-08-15
Payer: MEDICAID

## 2023-08-15 ENCOUNTER — TELEPHONE (OUTPATIENT)
Dept: FAMILY MEDICINE | Facility: CLINIC | Age: 48
End: 2023-08-15
Payer: MEDICAID

## 2023-08-15 VITALS
WEIGHT: 201.75 LBS | RESPIRATION RATE: 16 BRPM | SYSTOLIC BLOOD PRESSURE: 132 MMHG | TEMPERATURE: 98 F | HEIGHT: 66 IN | BODY MASS INDEX: 32.42 KG/M2 | HEART RATE: 63 BPM | OXYGEN SATURATION: 98 % | DIASTOLIC BLOOD PRESSURE: 84 MMHG

## 2023-08-15 DIAGNOSIS — J30.9 ALLERGIC RHINITIS, UNSPECIFIED SEASONALITY, UNSPECIFIED TRIGGER: ICD-10-CM

## 2023-08-15 DIAGNOSIS — D64.9 CHRONIC ANEMIA: ICD-10-CM

## 2023-08-15 DIAGNOSIS — F32.A DEPRESSION, UNSPECIFIED DEPRESSION TYPE: ICD-10-CM

## 2023-08-15 DIAGNOSIS — Z30.09 UNWANTED FERTILITY: Primary | ICD-10-CM

## 2023-08-15 DIAGNOSIS — Z12.11 COLON CANCER SCREENING: ICD-10-CM

## 2023-08-15 DIAGNOSIS — D50.9 IRON DEFICIENCY ANEMIA, UNSPECIFIED IRON DEFICIENCY ANEMIA TYPE: ICD-10-CM

## 2023-08-15 LAB
B-HCG UR QL: NEGATIVE
BASOPHILS # BLD AUTO: 0.03 X10(3)/MCL
BASOPHILS NFR BLD AUTO: 0.6 %
CTP QC/QA: YES
EOSINOPHIL # BLD AUTO: 0.08 X10(3)/MCL (ref 0–0.9)
EOSINOPHIL NFR BLD AUTO: 1.6 %
ERYTHROCYTE [DISTWIDTH] IN BLOOD BY AUTOMATED COUNT: 12.5 % (ref 11.5–17)
HCT VFR BLD AUTO: 46.7 % (ref 37–47)
HGB BLD-MCNC: 14.8 G/DL (ref 12–16)
IMM GRANULOCYTES # BLD AUTO: 0.01 X10(3)/MCL (ref 0–0.04)
IMM GRANULOCYTES NFR BLD AUTO: 0.2 %
IRON SATN MFR SERPL: 30 % (ref 20–50)
IRON SERPL-MCNC: 88 UG/DL (ref 50–170)
LYMPHOCYTES # BLD AUTO: 1.91 X10(3)/MCL (ref 0.6–4.6)
LYMPHOCYTES NFR BLD AUTO: 37.2 %
MCH RBC QN AUTO: 28.5 PG (ref 27–31)
MCHC RBC AUTO-ENTMCNC: 31.7 G/DL (ref 33–36)
MCV RBC AUTO: 89.8 FL (ref 80–94)
MONOCYTES # BLD AUTO: 0.38 X10(3)/MCL (ref 0.1–1.3)
MONOCYTES NFR BLD AUTO: 7.4 %
NEUTROPHILS # BLD AUTO: 2.73 X10(3)/MCL (ref 2.1–9.2)
NEUTROPHILS NFR BLD AUTO: 53 %
NRBC BLD AUTO-RTO: 0 %
PLATELET # BLD AUTO: 244 X10(3)/MCL (ref 130–400)
PMV BLD AUTO: 10.9 FL (ref 7.4–10.4)
RBC # BLD AUTO: 5.2 X10(6)/MCL (ref 4.2–5.4)
TIBC SERPL-MCNC: 201 UG/DL (ref 70–310)
TIBC SERPL-MCNC: 289 UG/DL (ref 250–450)
TRANSFERRIN SERPL-MCNC: 241 MG/DL (ref 180–382)
WBC # SPEC AUTO: 5.14 X10(3)/MCL (ref 4.5–11.5)

## 2023-08-15 PROCEDURE — 96372 THER/PROPH/DIAG INJ SC/IM: CPT | Mod: PBBFAC

## 2023-08-15 PROCEDURE — 85025 COMPLETE CBC W/AUTO DIFF WBC: CPT

## 2023-08-15 PROCEDURE — 36415 COLL VENOUS BLD VENIPUNCTURE: CPT

## 2023-08-15 PROCEDURE — 81025 URINE PREGNANCY TEST: CPT | Mod: PBBFAC

## 2023-08-15 PROCEDURE — 99215 OFFICE O/P EST HI 40 MIN: CPT | Mod: PBBFAC

## 2023-08-15 PROCEDURE — 83550 IRON BINDING TEST: CPT

## 2023-08-15 RX ORDER — ESCITALOPRAM OXALATE 20 MG/1
20 TABLET ORAL DAILY
Qty: 30 TABLET | Refills: 3 | Status: SHIPPED | OUTPATIENT
Start: 2023-08-15 | End: 2023-08-16

## 2023-08-15 RX ORDER — LURASIDONE HYDROCHLORIDE 60 MG/1
60 TABLET, FILM COATED ORAL DAILY
Qty: 30 TABLET | Refills: 3 | Status: SHIPPED | OUTPATIENT
Start: 2023-08-15 | End: 2023-09-07 | Stop reason: SDUPTHER

## 2023-08-15 RX ORDER — CETIRIZINE HYDROCHLORIDE 10 MG/1
10 TABLET ORAL DAILY
Qty: 90 TABLET | Refills: 0 | Status: SHIPPED | OUTPATIENT
Start: 2023-08-15 | End: 2023-10-17

## 2023-08-15 RX ORDER — MEDROXYPROGESTERONE ACETATE 150 MG/ML
150 INJECTION, SUSPENSION INTRAMUSCULAR ONCE
Status: COMPLETED | OUTPATIENT
Start: 2023-08-15 | End: 2023-08-15

## 2023-08-15 RX ORDER — FAMOTIDINE 20 MG/1
20 TABLET, FILM COATED ORAL 2 TIMES DAILY
Qty: 30 TABLET | Refills: 3 | Status: SHIPPED | OUTPATIENT
Start: 2023-08-15 | End: 2023-08-16

## 2023-08-15 RX ADMIN — MEDROXYPROGESTERONE ACETATE 150 MG: 150 INJECTION, SUSPENSION, EXTENDED RELEASE INTRAMUSCULAR at 01:08

## 2023-08-15 NOTE — PROGRESS NOTES
"[facility] OFFICE VISIT NOTE  Hawa Ortiz  [id number]  08/15/2023    Chief Complaint   Patient presents with    Depo         Hawa Ortiz is a 48 y.o. female  presenting to [facility] for 3 month Depo Provera injection.     HPI  Depo was initiated in Nov 2022 for menorrhagia. Pt reports  LMP May 2023. Denies complications with the injections.     Depression: Pt is tearful in the room. She reports feeling increased depression. Pt states she is seen by [facility] and her last apt was 3 months ago. She is currently prescribed Lexapro 20 mg and Latuda 60mg, but it is unknown if she has been taking her medications as history provided is not clear or consistent. She is still living with her daughter and grandchild. Reports this stresses her out ;she would prefer her own home but unable to move out on her own. She denies any new life stressors in the last few weeks.     Review of Systems   Constitutional:  Negative for fever.   Respiratory:  Negative for shortness of breath.    Cardiovascular:  Negative for chest pain.   Gastrointestinal:  Negative for abdominal pain.   Neurological:  Negative for headaches.   Psychiatric/Behavioral:  Positive for dysphoric mood. Negative for self-injury and suicidal ideas.        Blood pressure 132/84, pulse 63, temperature 98.4 °F (36.9 °C), temperature source Oral, resp. rate 16, height 5' 6" (1.676 m), weight 91.5 kg (201 lb 11.5 oz), SpO2 98 %.   Physical Exam  Vitals reviewed.   Constitutional:       Appearance: She is not ill-appearing.   HENT:      Head: Normocephalic and atraumatic.      Right Ear: External ear normal.      Left Ear: External ear normal.      Nose: Nose normal.   Eyes:      Extraocular Movements: Extraocular movements intact.      Conjunctiva/sclera: Conjunctivae normal.   Cardiovascular:      Rate and Rhythm: Normal rate and regular rhythm.   Pulmonary:      Effort: Pulmonary effort is normal.      Breath sounds: Normal breath sounds.   Musculoskeletal: "      Cervical back: Normal range of motion.      Right lower leg: No edema.      Left lower leg: No edema.   Skin:     General: Skin is warm and dry.   Neurological:      General: No focal deficit present.      Mental Status: She is alert.   Psychiatric:      Comments: Tearful, dysphoric mood, appears anxious          Current Medications:   Current Outpatient Medications   Medication Sig Dispense Refill    albuterol (VENTOLIN HFA) 90 mcg/actuation inhaler Inhale 2 puffs into the lungs every 6 (six) hours as needed for Wheezing. Rescue 18 g 1    azelastine (ASTELIN) 137 mcg (0.1 %) nasal spray 1 spray (137 mcg total) by Nasal route 2 (two) times daily. 30 mL 5    cetirizine (ZYRTEC) 10 MG tablet Take 1 tablet (10 mg total) by mouth once daily. 90 tablet 0    diclofenac sodium (VOLTAREN) 1 % Gel Apply 2 g topically 4 (four) times daily as needed (pain). Do not exceed 32 grams/day: do not to exceed 8 grams/day/single joint of upper extremities; do not to exceed 16 grams/day/single joint of lower extremities.  Please request refill of this medication from your PCP. 100 g 3    EScitalopram oxalate (LEXAPRO) 20 MG tablet Take 1 tablet (20 mg total) by mouth once daily. 30 tablet 3    famotidine (PEPCID) 20 MG tablet Take 1 tablet (20 mg total) by mouth 2 (two) times daily. 30 tablet 3    FEROSUL 325 mg (65 mg iron) Tab tablet Take by mouth.      ferrous sulfate 325 (65 FE) MG EC tablet Take 1 tablet (325 mg total) by mouth once daily. 90 tablet 3    fluticasone propionate (FLONASE) 50 mcg/actuation nasal spray 1 spray (50 mcg total) by Each Nostril route 2 (two) times a day. 15.8 mL 12    gabapentin (NEURONTIN) 300 MG capsule Take 1 capsule (300 mg total) by mouth 3 (three) times daily. 90 capsule 6    LATUDA 60 mg Tab tablet Take 1 tablet (60 mg total) by mouth once daily. 30 tablet 3    pantoprazole (PROTONIX) 40 MG tablet Take 1 tablet (40 mg total) by mouth once daily. 30 tablet 11    rizatriptan (MAXALT) 10 MG tablet  Take 1 tablet (10 mg total) by mouth 2 (two) times daily as needed for Migraine. 12 tablet 4    sodium chloride (SALINE MIST) 0.65 % nasal spray 1 spray by Nasal route 2 (two) times a day. 15 mL 12    topiramate (TOPAMAX) 100 MG tablet Take 1 tablet (100 mg total) by mouth 2 (two) times daily. 180 tablet 3    triamcinolone acetonide 0.1% (KENALOG) 0.1 % ointment Apply topically 2 (two) times daily. 80 g 0     Current Facility-Administered Medications   Medication Dose Route Frequency Provider Last Rate Last Admin    medroxyPROGESTERone (DEPO-PROVERA) injection 150 mg  150 mg Intramuscular Q90 Days Archie Chen MD   150 mg at 05/01/23 1343    medroxyPROGESTERone (DEPO-PROVERA) syringe 150 mg  150 mg Intramuscular Q90 Days Archie Chen MD   150 mg at 11/17/22 1223       Assessment:   1. Unwanted fertility    2. Depression, unspecified depression type    3. Allergic rhinitis, unspecified seasonality, unspecified trigger    4. Iron deficiency anemia, unspecified iron deficiency anemia type    5. Chronic anemia    6. Colon cancer screening        Plan:  Menorrhagia:   - UPT negative  - Depo injection given today.  - F/u for next depo in 3 months    Depression:   - Offered pt therapy referral. States she may be interested in discussing at next apt.   - Refills sent for Latuda and Lexapro.     Allergies:  - Zyrtec refilled     GERD:  - Pepcid refill sent    Hx of anemia:   - CBC and iron ordered today.   - PCP sent GI referral for colonoscopy in May due to low H&H. Referral pending. New referral sent today.     Health Maintenance:   - Pt has no previous colon cancer screening. Colonoscopy referrals pending.   - Pap smear was due 1/2023. Pt aware and will discuss at next visit,  - Mammogram completed May 2023.    Orders Placed This Encounter    Iron and TIBC    CBC Auto Differential    CBC with Differential    Ambulatory referral/consult to Gastroenterology    POCT urine pregnancy    medroxyPROGESTERone (DEPO-PROVERA)  injection 150 mg    cetirizine (ZYRTEC) 10 MG tablet    famotidine (PEPCID) 20 MG tablet    LATUDA 60 mg Tab tablet    EScitalopram oxalate (LEXAPRO) 20 MG tablet       F/u with PCP Dr. Chen in 1 month    Anusha Griffin,   [facility]

## 2023-08-16 ENCOUNTER — TELEPHONE (OUTPATIENT)
Dept: FAMILY MEDICINE | Facility: CLINIC | Age: 48
End: 2023-08-16
Payer: MEDICAID

## 2023-08-16 DIAGNOSIS — Z12.11 COLON CANCER SCREENING: ICD-10-CM

## 2023-08-16 DIAGNOSIS — D64.9 CHRONIC ANEMIA: Primary | ICD-10-CM

## 2023-08-16 RX ORDER — FAMOTIDINE 20 MG/1
20 TABLET, FILM COATED ORAL 2 TIMES DAILY
Qty: 60 TABLET | Refills: 3 | Status: SHIPPED | OUTPATIENT
Start: 2023-08-16 | End: 2023-10-17 | Stop reason: ALTCHOICE

## 2023-08-16 NOTE — PROGRESS NOTES
I reviewed History, PE, A/P and medical record.  Services provided in outpatient department of a teaching hospital/facility, I was immediately available.  I agree with resident, care reasonable and necessary.   I evaluated the patient with resident at time of visit, participated in key parts of H/P and management was discussed.    HM  PAP 1/2020 NIL - no HPV done   Mammo 5/2023    Meds she actually takes  Latuda  Zyrtec  Topamax  Iron  Pepcid  ?Gabapentin    Depo was > 1 week late but UPT (-)  Tearful throughout visit, no SI HI, turns out was out of Latuda for > 1 month  Goes to [place] but we rx her meds  Agrees to PAP next visit  Advised to bring all meds next visit  New GI referral placed, per pt she called the other GI and they didn't have the referrals in the past  Today's iron panel is normal  -ROMÁN has resolved since starting Depo about 1 yr ago for heavy menses    Lynne Sal MD  [facility] [place]

## 2023-08-17 ENCOUNTER — TELEPHONE (OUTPATIENT)
Dept: HEPATOLOGY | Facility: HOSPITAL | Age: 48
End: 2023-08-17
Payer: MEDICAID

## 2023-08-17 NOTE — TELEPHONE ENCOUNTER
Pt had not been taking Lexapro 20mg for months and was being managed by Latuda alone prior to running out. Will discontinue Lexapro. Consider starting her on lower dose if needed at next visit with PCP on 9/7. I have called the pharmacy to cancel Lexapro refill. Pt had not picked up medication yet. Attempted to call pt to inform but no answer.    Anusha Griffin,   [facility]

## 2023-09-07 ENCOUNTER — OFFICE VISIT (OUTPATIENT)
Dept: FAMILY MEDICINE | Facility: CLINIC | Age: 48
End: 2023-09-07
Payer: MEDICAID

## 2023-09-07 VITALS
HEART RATE: 99 BPM | BODY MASS INDEX: 32.43 KG/M2 | WEIGHT: 201.81 LBS | TEMPERATURE: 98 F | OXYGEN SATURATION: 97 % | HEIGHT: 66 IN | DIASTOLIC BLOOD PRESSURE: 82 MMHG | RESPIRATION RATE: 19 BRPM | SYSTOLIC BLOOD PRESSURE: 119 MMHG

## 2023-09-07 DIAGNOSIS — G43.711 CHRONIC MIGRAINE WITHOUT AURA, INTRACTABLE, WITH STATUS MIGRAINOSUS: ICD-10-CM

## 2023-09-07 DIAGNOSIS — Z12.4 CERVICAL CANCER SCREENING: ICD-10-CM

## 2023-09-07 DIAGNOSIS — F31.9 BIPOLAR AFFECTIVE DISORDER, REMISSION STATUS UNSPECIFIED: Primary | ICD-10-CM

## 2023-09-07 PROCEDURE — 99215 OFFICE O/P EST HI 40 MIN: CPT | Mod: PBBFAC | Performed by: STUDENT IN AN ORGANIZED HEALTH CARE EDUCATION/TRAINING PROGRAM

## 2023-09-07 PROCEDURE — 88174 CYTOPATH C/V AUTO IN FLUID: CPT | Performed by: STUDENT IN AN ORGANIZED HEALTH CARE EDUCATION/TRAINING PROGRAM

## 2023-09-07 PROCEDURE — 87624 HPV HI-RISK TYP POOLED RSLT: CPT

## 2023-09-07 PROCEDURE — 87625 HPV TYPES 16 & 18 ONLY: CPT

## 2023-09-07 RX ORDER — GABAPENTIN 300 MG/1
300 CAPSULE ORAL 3 TIMES DAILY
Qty: 90 CAPSULE | Refills: 6 | Status: SHIPPED | OUTPATIENT
Start: 2023-09-07 | End: 2023-11-15 | Stop reason: SDUPTHER

## 2023-09-07 RX ORDER — TOPIRAMATE 100 MG/1
100 TABLET, FILM COATED ORAL 2 TIMES DAILY
Qty: 180 TABLET | Refills: 3 | Status: SHIPPED | OUTPATIENT
Start: 2023-09-07 | End: 2023-11-15 | Stop reason: SDUPTHER

## 2023-09-07 RX ORDER — LURASIDONE HYDROCHLORIDE 60 MG/1
60 TABLET, FILM COATED ORAL DAILY
Qty: 30 TABLET | Refills: 3 | Status: SHIPPED | OUTPATIENT
Start: 2023-09-07

## 2023-09-07 NOTE — PROGRESS NOTES
Subjective:       Patient ID: Hawa Ortiz is a 48 y.o. female.    Chief Complaint: Follow-up (1 month follow up) and Medication Refill (Gabapentin 300 mg, Topamax 100 mg, and Latuda 60 mg)    HPI  49yo F with PMHx Bipolar Disorder, Migraine headaches and radicular pain, presenting for medication refills.  No recent manic episodes. No noticeable side effects from medications.      Health Maintenance  Has colonoscopy scheduled with Dr. Dunham.  Last pap 1/2020. NIL, but no HPV testing.  MMG 5.31.23       Objective:      Vitals:    09/07/23 1333   BP: 119/82   Pulse: 99   Resp: 19   Temp: 97.5 °F (36.4 °C)       Physical Exam  Exam conducted with a chaperone present.   Neurological:      Mental Status: She is alert.     Vaginal Exam: No inguinal lymphadenopathy on palpation. No vulvar erythema or lesions. Vaginal mucosa without atrophy, erythema. Physiologic discharge present. Cervix well visualized without lesion or erythema. No cervical motion tenderness to palpation.     Chaperone, Carissa Herrmann MA present for  exam:      Assessment:       1. Bipolar affective disorder, remission status unspecified    2. Chronic migraine without aura, intractable, with status migrainosus    3. Cervical cancer screening        Plan:       Medications refilled.  Co-testing performed today. Will call with results.      RTC in two months for Depo Provera 11.15.23.    Archie Chen MD, MPH  [facility]

## 2023-09-08 NOTE — PROGRESS NOTES
I reviewed History, PE, A/P and chart was reviewed.  Services provided in the outpatient department of  a teaching facility, I was immediately available.  I agree with resident, care reasonable and necessary.   Management discussed with resident at time of visit.    Doing much better since back on meds    Lynne Sal MD  [facility] [place]

## 2023-09-12 LAB — PSYCHE PATHOLOGY RESULT: NORMAL

## 2023-10-17 ENCOUNTER — OFFICE VISIT (OUTPATIENT)
Dept: OTOLARYNGOLOGY | Facility: CLINIC | Age: 48
End: 2023-10-17
Payer: MEDICAID

## 2023-10-17 VITALS
SYSTOLIC BLOOD PRESSURE: 120 MMHG | HEART RATE: 67 BPM | OXYGEN SATURATION: 100 % | TEMPERATURE: 98 F | WEIGHT: 202.31 LBS | HEIGHT: 66 IN | BODY MASS INDEX: 32.51 KG/M2 | DIASTOLIC BLOOD PRESSURE: 85 MMHG | RESPIRATION RATE: 18 BRPM

## 2023-10-17 DIAGNOSIS — K21.9 GASTROESOPHAGEAL REFLUX DISEASE, UNSPECIFIED WHETHER ESOPHAGITIS PRESENT: ICD-10-CM

## 2023-10-17 DIAGNOSIS — E04.2 MULTIPLE THYROID NODULES: ICD-10-CM

## 2023-10-17 DIAGNOSIS — J30.9 ALLERGIC RHINITIS, UNSPECIFIED SEASONALITY, UNSPECIFIED TRIGGER: Primary | ICD-10-CM

## 2023-10-17 DIAGNOSIS — R13.10 DYSPHAGIA, UNSPECIFIED TYPE: ICD-10-CM

## 2023-10-17 PROCEDURE — 99214 OFFICE O/P EST MOD 30 MIN: CPT | Mod: PBBFAC | Performed by: STUDENT IN AN ORGANIZED HEALTH CARE EDUCATION/TRAINING PROGRAM

## 2023-10-17 RX ORDER — OMEPRAZOLE 20 MG/1
20 CAPSULE, DELAYED RELEASE ORAL DAILY
Qty: 90 CAPSULE | Refills: 3 | Status: SHIPPED | OUTPATIENT
Start: 2023-10-17 | End: 2024-10-16

## 2023-10-17 RX ORDER — CETIRIZINE HYDROCHLORIDE 10 MG/1
10 TABLET ORAL DAILY
Qty: 360 TABLET | Refills: 0 | Status: SHIPPED | OUTPATIENT
Start: 2023-10-17 | End: 2023-12-07 | Stop reason: SDUPTHER

## 2023-10-17 NOTE — PROGRESS NOTES
[facility]  Otolaryngology Clinic Note    Hawa Ortiz  Encounter Date: 10/17/2023  YOB: 1975  Physician: Walter Aguirre MD    Chief Complaint: dysphagia    HPI: Hawa Ortiz is a 48 y.o. female sent by her primary care physician for persistent dysphagia, sore throat particularly to water, difficulty with swallowing meats, intermittent hoarseness, postnasal drip, itchy eyes and nose, anterior rhinorrhea, sneezing, anterior neck discomfort.  She reports this has been as long as she can recall.  She had a thyroid ultrasound which had demonstrated thyroid nodules not meeting FNA criteria.  Her throat discomfort and nonproductive cough are worse at night prior to going to bed.  She frequently feels like solids gets stuck in her throat and require great effort to push down.    8/25/22: Here today for follow up. Continues to endorse throat pain that is worse at night.  She states she does have reflux symptoms including burning chest pain and acid taste in her mouth. Occasionally loses her voice.  She does not take any medication for this. We discussed foods to avoid however she states where she lives, other people buy the food and she has no control over it. Unfortunately she eats a lot of friend chicken and pizza.  Has been using flonase and astelin but does not feel this has helped much with her nasal symptoms.     11/29/22:  Returns for follow up.  Obtained RAST testing today, results not back yet.  Reports using flonase and astelin.  Reports she has dysphagia to meats only.  No problems with liquids.  Has not seen GI yet.  Reports she's had EGDs done before with last one being around 2000/2001.    5/30/23: Here today for follow up. She reports that she has not yet her back from GI, despite two referrals, placed both by us and internal medicine. She has not tried to call their clinic yet. She continues to have issues swallowing meats in particular and when asked about her  anxiety to eating which was noted in the SLP report, she became tearful. She reports being very fearful of not being able to swallow which gives her a lot of stress. She sees [facility] and reports being on medication for her anxiety. As far as the allergies go, she reports persistent sneezing and runny nose. She is not currently taking any allergy medicines because she says she gets overwhelmed by all of the options.     10/17/23: Here today to follow up after thyroid US, discuss allergies and dysphagia. Patient reports she has an appt with Dr. Dunham with GI in November. She is having persistent issues swallowing meats. She feels like it's getting stuck when swallowing. This does not happen with any other foods or with liquids. She denies feeling like food or drink is going down the wrong pipe and denies it making her cough. She gets tearful when asked about her trouble swallowing and reports feeling very anxious about it. She has not discussed this with her mental health provider. She endorses heartburn and is on pepcid but doesn't feel like it's helping. She often eats meals shortly before bed as well. She denies any voice changes.     ROS:   General: Negative except per HPI  Skin: Denies rash, ulcer, or lesion.  Eyes: Denies vision changes or diplopia.  Ears: Negative except per HPI  Nose: Negative except per HPI  Throat/mouth: Negative except per HPI  Cardiovascular: Negative except per HPI  Respiratory: Negative except per HPI  Neck: Negative except per HPI  Endocrine: Negative except per HPI  Neurologic: Negative except per HPI    Other 10-point review of systems negative except per HPI      Review of patient's allergies indicates:  No Known Allergies    Past Medical History:   Diagnosis Date    Arthritis     Borderline diabetes     Migraine        Past Surgical History:   Procedure Laterality Date    CHOLECYSTECTOMY      EYE SURGERY Bilateral        Social History     Socioeconomic History     Marital status: Single   Tobacco Use    Smoking status: Former     Types: Cigarettes    Smokeless tobacco: Never   Substance and Sexual Activity    Alcohol use: Not Currently    Drug use: Never    Sexual activity: Not Currently       Family History   Problem Relation Age of Onset    Heart disease Mother     Aneurysm Neg Hx     Cancer Neg Hx     Clotting disorder Neg Hx     Dementia Neg Hx     Diabetes Neg Hx     Fainting Neg Hx     Hyperlipidemia Neg Hx     Kidney disease Neg Hx     Liver disease Neg Hx     Migraines Neg Hx     Neuropathy Neg Hx     Obesity Neg Hx     Parkinsonism Neg Hx     Seizures Neg Hx     Stroke Neg Hx     Tremor Neg Hx        Outpatient Encounter Medications as of 10/17/2023   Medication Sig Dispense Refill    albuterol (VENTOLIN HFA) 90 mcg/actuation inhaler Inhale 2 puffs into the lungs every 6 (six) hours as needed for Wheezing. Rescue 18 g 1    cetirizine (ZYRTEC) 10 MG tablet Take 1 tablet (10 mg total) by mouth once daily. 90 tablet 0    diclofenac sodium (VOLTAREN) 1 % Gel Apply 2 g topically 4 (four) times daily as needed (pain). Do not exceed 32 grams/day: do not to exceed 8 grams/day/single joint of upper extremities; do not to exceed 16 grams/day/single joint of lower extremities.  Please request refill of this medication from your PCP. 100 g 3    famotidine (PEPCID) 20 MG tablet Take 1 tablet (20 mg total) by mouth 2 (two) times daily. 60 tablet 3    ferrous sulfate 325 (65 FE) MG EC tablet Take 1 tablet (325 mg total) by mouth once daily. 90 tablet 3    fluticasone propionate (FLONASE) 50 mcg/actuation nasal spray 1 spray (50 mcg total) by Each Nostril route 2 (two) times a day. 15.8 mL 12    gabapentin (NEURONTIN) 300 MG capsule Take 1 capsule (300 mg total) by mouth 3 (three) times daily. 90 capsule 6    LATUDA 60 mg Tab tablet Take 1 tablet (60 mg total) by mouth once daily. 30 tablet 3    sodium chloride (SALINE MIST) 0.65 % nasal spray 1 spray by Nasal route 2 (two) times a  "day. 15 mL 12    topiramate (TOPAMAX) 100 MG tablet Take 1 tablet (100 mg total) by mouth 2 (two) times daily. 180 tablet 3     Facility-Administered Encounter Medications as of 10/17/2023   Medication Dose Route Frequency Provider Last Rate Last Admin    medroxyPROGESTERone (DEPO-PROVERA) injection 150 mg  150 mg Intramuscular Q90 Days Archie Chen MD   150 mg at 05/01/23 1343    medroxyPROGESTERone (DEPO-PROVERA) syringe 150 mg  150 mg Intramuscular Q90 Days Archie Chen MD   150 mg at 11/17/22 1223       Physical Exam:  Vitals:    10/17/23 1419   BP: 120/85   BP Location: Left arm   Patient Position: Sitting   BP Method: Medium (Automatic)   Pulse: 67   Resp: 18   Temp: 98.2 °F (36.8 °C)   SpO2: 100%   Weight: 91.8 kg (202 lb 4.8 oz)   Height: 5' 6" (1.676 m)       Constitutional  General Appearance: well nourished, well-developed, AAO x3, NAD  HEENT  Eyes: PEERLA, EOMI, normal conjunctivae  Ears: Hearing well at conversation level   AD: auricle normal, EAC normal, TM intact, no RAFA   AS: auricle normal, EAC normal, TM intact, no RAFA  Nose: septum midline, no inferior turbinate hypertrophy, no polyps, moist mucosa  OC/OP: dentition poor, no oral lesions, tongue/FOM/BOT- soft, no leukoplakia/ulcerations/ tenderness  Neck: soft, non-tender, no palpable lymph nodes   Thyroid region- no nodules or goiter  Neuro: CN II - XII intact bilaterally  Cardiovascular: peripheral pulses palpable  Respiratory: non-labored respirations  Psychiatric: oriented to time, place and person, no depression, anxiety or agitation    US SOFT TISSUE HEAD NECK THYROID     CLINICAL HISTORY:  Nontoxic multinodular goiter     TECHNIQUE:  Ultrasound of the thyroid and cervical lymph nodes was performed.     COMPARISON:  May 24, 2022     FINDINGS:  Examination reveals the right hemithyroid to measure 4.7 x 1.5 x 1.7 cm left benigno thyroid measures 4.7 x 1.8 x 1.5 cm isthmus measures 3.1 mm in thickness     Subcentimeter nodules are identified " in the right hemithyroid measuring the largest 6 mm x 5 mm x 4 mm other subcentimeter nodules measure 2 mm x 1 mm x 2 mm and 2 mm x 1 mm x 1 mm.     On the left anechoic cyst is again identified measuring 1.9 x 1.2 x 1.1 cm stable as compared with the previous exam no new abnormalities identified     Impression:     Subcentimeter nodules in the right hemithyroid do not meet criteria for biopsy and or surveillance.     Anechoic lesion in the left benigno thyroid likely representing a cyst stable as compared with the previous exam       Assessment/Plan:  Hawa Ortiz is a 48 y.o. female with LPRD/GERD, allergic rhinitis. Has subcentimeter right thyroid nodules, nearly 2 cm cystic nodule of left thyroid. Recently underwent MBSS which was normal but they did note multiple refusals to swallow due to fear/anxiety of swallowing.   - Follow up with GI in November for dsyphagia.   - Zyrtec for allergies, she feels like the flonase burns and denies any nasal congestion so I discussed that she's okay to stop the flonase. Aquaphor samples given to use in her nose at night.   - I also encouraged her to speak with her mental health doctors about her anxieties towards eating as that seems to be playing into this as well.   - Thyroid cyst and subcentimeter nodules do not warrant further follow up.   - RTC 6 months to ensure she's talked to GI about her dysphagia.       Kate Ortiz, PGY-4  [employer] [facility]

## 2023-11-15 ENCOUNTER — OFFICE VISIT (OUTPATIENT)
Dept: NEUROLOGY | Facility: CLINIC | Age: 48
End: 2023-11-15
Payer: MEDICAID

## 2023-11-15 VITALS
DIASTOLIC BLOOD PRESSURE: 80 MMHG | SYSTOLIC BLOOD PRESSURE: 123 MMHG | WEIGHT: 201 LBS | BODY MASS INDEX: 32.3 KG/M2 | OXYGEN SATURATION: 99 % | HEIGHT: 66 IN | HEART RATE: 70 BPM

## 2023-11-15 DIAGNOSIS — G43.711 CHRONIC MIGRAINE WITHOUT AURA, INTRACTABLE, WITH STATUS MIGRAINOSUS: Primary | ICD-10-CM

## 2023-11-15 DIAGNOSIS — Z23 FLU VACCINE NEED: ICD-10-CM

## 2023-11-15 DIAGNOSIS — E66.9 CLASS 1 OBESITY WITH SERIOUS COMORBIDITY AND BODY MASS INDEX (BMI) OF 32.0 TO 32.9 IN ADULT, UNSPECIFIED OBESITY TYPE: ICD-10-CM

## 2023-11-15 PROBLEM — E66.811 CLASS 1 OBESITY WITH SERIOUS COMORBIDITY AND BODY MASS INDEX (BMI) OF 32.0 TO 32.9 IN ADULT: Status: ACTIVE | Noted: 2023-11-15

## 2023-11-15 PROCEDURE — 1159F PR MEDICATION LIST DOCUMENTED IN MEDICAL RECORD: ICD-10-PCS | Mod: CPTII,,, | Performed by: NURSE PRACTITIONER

## 2023-11-15 PROCEDURE — 1160F RVW MEDS BY RX/DR IN RCRD: CPT | Mod: CPTII,,, | Performed by: NURSE PRACTITIONER

## 2023-11-15 PROCEDURE — 3008F BODY MASS INDEX DOCD: CPT | Mod: CPTII,,, | Performed by: NURSE PRACTITIONER

## 2023-11-15 PROCEDURE — 3079F DIAST BP 80-89 MM HG: CPT | Mod: CPTII,,, | Performed by: NURSE PRACTITIONER

## 2023-11-15 PROCEDURE — 1159F MED LIST DOCD IN RCRD: CPT | Mod: CPTII,,, | Performed by: NURSE PRACTITIONER

## 2023-11-15 PROCEDURE — 3074F PR MOST RECENT SYSTOLIC BLOOD PRESSURE < 130 MM HG: ICD-10-PCS | Mod: CPTII,,, | Performed by: NURSE PRACTITIONER

## 2023-11-15 PROCEDURE — 99214 PR OFFICE/OUTPT VISIT, EST, LEVL IV, 30-39 MIN: ICD-10-PCS | Mod: FQ,S$PBB,, | Performed by: NURSE PRACTITIONER

## 2023-11-15 PROCEDURE — 3008F PR BODY MASS INDEX (BMI) DOCUMENTED: ICD-10-PCS | Mod: CPTII,,, | Performed by: NURSE PRACTITIONER

## 2023-11-15 PROCEDURE — 3079F PR MOST RECENT DIASTOLIC BLOOD PRESSURE 80-89 MM HG: ICD-10-PCS | Mod: CPTII,,, | Performed by: NURSE PRACTITIONER

## 2023-11-15 PROCEDURE — 99214 OFFICE O/P EST MOD 30 MIN: CPT | Mod: FQ,S$PBB,, | Performed by: NURSE PRACTITIONER

## 2023-11-15 PROCEDURE — 1160F PR REVIEW ALL MEDS BY PRESCRIBER/CLIN PHARMACIST DOCUMENTED: ICD-10-PCS | Mod: CPTII,,, | Performed by: NURSE PRACTITIONER

## 2023-11-15 PROCEDURE — 3074F SYST BP LT 130 MM HG: CPT | Mod: CPTII,,, | Performed by: NURSE PRACTITIONER

## 2023-11-15 PROCEDURE — 99214 OFFICE O/P EST MOD 30 MIN: CPT | Mod: PBBFAC | Performed by: NURSE PRACTITIONER

## 2023-11-15 PROCEDURE — 90686 IIV4 VACC NO PRSV 0.5 ML IM: CPT | Mod: PBBFAC

## 2023-11-15 RX ORDER — FAMOTIDINE 20 MG/1
20 TABLET, FILM COATED ORAL 2 TIMES DAILY
COMMUNITY
Start: 2023-10-17

## 2023-11-15 RX ORDER — TOPIRAMATE 100 MG/1
100 TABLET, FILM COATED ORAL 2 TIMES DAILY
Qty: 180 TABLET | Refills: 3 | Status: SHIPPED | OUTPATIENT
Start: 2023-11-15 | End: 2024-03-07 | Stop reason: SDUPTHER

## 2023-11-15 RX ORDER — RIZATRIPTAN BENZOATE 10 MG/1
10 TABLET ORAL
Qty: 12 TABLET | Refills: 2 | Status: SHIPPED | OUTPATIENT
Start: 2023-11-15 | End: 2024-11-14

## 2023-11-15 RX ORDER — TRAMADOL HYDROCHLORIDE 50 MG/1
TABLET ORAL
COMMUNITY
Start: 2023-09-14 | End: 2024-03-07

## 2023-11-15 RX ORDER — GABAPENTIN 300 MG/1
300 CAPSULE ORAL 3 TIMES DAILY
Qty: 270 CAPSULE | Refills: 3 | Status: SHIPPED | OUTPATIENT
Start: 2023-11-15 | End: 2024-03-07 | Stop reason: SDUPTHER

## 2023-11-15 NOTE — PROGRESS NOTES
[facility] Follow Up Office Visit Note  Subjective:      Patient ID: Hawa Ortiz is a 48 y.o. female.    Chief Complaint: Migraine (Patient reports migraines are about the same.)    HPI  This is a 48 year old Right Handed F with PMHX of HTN, GERD, mild intellectual delay, cataract sx 1/2022 and 3/2022, who was referred for headache disorder. Patient was last seen on 4/5/2023. During that visit, gabapentin 300mg TID, TPM 100mg BID and rizatriptan 10mg BID PRN were continued.    Today, Pt states migraines have improved to 1-2 week. Last migraine 2 weeks ago. Rizatriptan effective as abortive therapy. Lives with people who smoke. Cannot take NSAIDS as she states she has GI issues. Sleep has improved. Appetite is intermittent. Began crying during visit, she states, due to increased stress as she has her daughter and granddaughter living with her. Lives with friends and family. Not currently followed by mental health.    Age of Onset - childhood    Semiology -  frontal, dull, headache, w/o nausea, w/ photophobia, lasting around all day - 30 headache days per month    Frontal, pounding, headache, w/ nausea, w/ photophobia, w/ phonophobia - 0 migraine headache days per month.    Frequency - 28 headaches/month w/4 migraine headache days per month.    Exacerbating Factors - stress    Risk Factors -  - Family history of headache disorder? denied  - History of Head Trauma? unable to recall  - History of CNS infection? denied  - History of underlying mood disorder? + mood swing  - Illicit drug use? denied  - Tobacco use? denied  - History of sleep disorder? Repeat PSG 12/21/2020 - snoring     Workup -  - MRI Brain +/- Jason 4/2019 - I have reviewed the study independently and with the patient. Unremarkable MRI Brain.    Current ppx meds -  TPM 100mg BID (2/25/2021- present) - denied any renal stones.  Gabapentin 300mg BID (9/29/2021 - present)    Current abortive meds -  Tylenol 2 tabs PRN - intermittently  effective  Rizatriptan 10mg PO BID PRN (1/5/2023 - present) - effective    Prior ppx meds -  Effexor 37.5mg daily (7/1/2019 - 9/18/2019) - ineffective  Nortriptyline 25mg qhs (5/2019-6/1/2019) - ineffective  Elavil 25mg qhs (10/1/2018 - 10/28/2018) - ineffective    Prior abortive meds -   Fioricet prn -   Baclofen BID prn    Review of Systems   Constitutional: no fever, + fatigue, weakness  Eye: no vision loss, eye redness, drainage, or pain  ENMT: no sore throat, ear pain, sinus pain/congestion, nasal congestion/drainage  Respiratory: no cough, no wheezing, no shortness of breath  Cardiovascular: no chest pain, no palpitations, no edema  Gastrointestinal: no nausea, vomiting, or diarrhea. No abdominal pain  Genitourinary: no dysuria, no urinary frequency or urgency, no hematuria  Hema/Lymph: no abnormal bruising or bleeding  Endocrine: no heat or cold intolerance, no excessive thirst or excessive urination  Musculoskeletal: + muscle or joint pain, no joint swelling  Integumentary: no skin rash or abnormal lesion  Psychiatric: + depressed mood,+ anxiety, no visual/auditory hallucinations, no delusions, no suicidal or homicidal ideation  Neurologic: as per HPI    Objective:      Physical Exam    General: well-developed well-nourished in no acute distress  Eye: clear conjunctiva, eyelids normal  HENT: oropharynx without erythema/exudate, oropharynx and nasal mucosal surfaces moist, Neck: full range of motion  Respiratory: no distress on RA  Cardiovascular: regular rate and rhythm  Gastrointestinal: round, no guarding or distension  Musculoskeletal: full range of motion of all extremities/spine without limitation or discomfort  Integumentary: no rashes or skin lesions present    Neurologic:  Mental Status- Alert and Oriented to time, self, place, Speech is fluent, with intact repetition, naming, reading, and comprehension., No Dysarthria.  CN II-XII - CN I Deferred, ROSELINE, no RAPD, VA grossly normal to finger counting  at 6ft, No ptosis b/l, EOMI w/o nystagmus, LT/PP symmetric, intact in CN V1-V3 distribution b/l, masseter symmetric b/l, T/U midline, Shoulder shrug symmetric b/l, Hearing grossly intact b/l, VFFC, Face Symmetric.  Motor - Tone and Bulk nml throughout, No involuntary movements, 5/5 to confrontation throughout, FFM nml b/l, No pronator drift.   Sensory - LT/PP/Temp/Proprioception/Vibration symmetric intact throughout.  Reflexes - 2+ Throughout  Cerebellar Exam - FNF wnl b/l no intention tremor.  Romberg - negative.  Gait - Normal, bilat arm swing intact    Assessment:       This is a 48 year old Right Handed F with PMHX of HTN, GERD, JESU, mild intellectual delay, who was referred for headache. PSG showed snoring. Migraine improved to 1-2/month. Rizatriptan effective as abortive therapy. Discussed not being exposed second hand smoke as it could exacerbate HA. Cannot take NSAIDS. Currently living with friends and family, notes increased stress. Became tearful during visit. Not currently followed by mental health.    1. Chronic migraine without aura, intractable, with status migrainosus    2. Class 1 obesity with serious comorbidity and body mass index (BMI) of 32.0 to 32.9 in adult, unspecified obesity type    3. Flu vaccine need  - Influenza - Quadrivalent *Preferred* (6 months+) (PF)      Plan:       [] c/w TPM 100mg BID   [] c/w Neurontin 300mg to TID  [] c/w rizatriptan 10mg PO BID PRN migraine  [] influenza vaccination today  [] look on [employer] store for free exercise apps and exercise 30 min/day x 5 days for overall health and wellness  [] call [facility] for appt    RTC 6 months    Headache education provided - including good sleep hygiene, stress management, medication overuse education provided - using more 3 OTC per week may worsen headaches, High intensity interval training has shown to reduce headache frequency. Low carb, high protein has shown to reduce headache frequency. Patient is instructed to  keep headache diary    I have explained the treatment plan, diagnosis, and prognosis to the patient, caretaker, family. All questions are answered to the best of my knowledge.    Face to Face Time 30 minute, including, counseling, education, review of test results, relevant medical records, and coordination of care.

## 2023-12-07 ENCOUNTER — OFFICE VISIT (OUTPATIENT)
Dept: FAMILY MEDICINE | Facility: CLINIC | Age: 48
End: 2023-12-07
Payer: MEDICAID

## 2023-12-07 VITALS
TEMPERATURE: 98 F | OXYGEN SATURATION: 100 % | HEIGHT: 66 IN | BODY MASS INDEX: 32.3 KG/M2 | DIASTOLIC BLOOD PRESSURE: 71 MMHG | HEART RATE: 60 BPM | WEIGHT: 201 LBS | RESPIRATION RATE: 18 BRPM | SYSTOLIC BLOOD PRESSURE: 116 MMHG

## 2023-12-07 DIAGNOSIS — Z30.9 ENCOUNTER FOR CONTRACEPTIVE MANAGEMENT, UNSPECIFIED TYPE: ICD-10-CM

## 2023-12-07 DIAGNOSIS — J30.2 SEASONAL ALLERGIC RHINITIS, UNSPECIFIED TRIGGER: ICD-10-CM

## 2023-12-07 DIAGNOSIS — Z59.89 DISTRESSED ABOUT HOUSING ISSUES: ICD-10-CM

## 2023-12-07 DIAGNOSIS — N93.9 ABNORMAL UTERINE BLEEDING (AUB): Primary | ICD-10-CM

## 2023-12-07 DIAGNOSIS — D50.9 IRON DEFICIENCY ANEMIA, UNSPECIFIED IRON DEFICIENCY ANEMIA TYPE: ICD-10-CM

## 2023-12-07 LAB
B-HCG UR QL: NEGATIVE
CTP QC/QA: YES

## 2023-12-07 PROCEDURE — 99214 OFFICE O/P EST MOD 30 MIN: CPT | Mod: PBBFAC | Performed by: STUDENT IN AN ORGANIZED HEALTH CARE EDUCATION/TRAINING PROGRAM

## 2023-12-07 PROCEDURE — 96372 THER/PROPH/DIAG INJ SC/IM: CPT | Mod: PBBFAC

## 2023-12-07 PROCEDURE — 81025 URINE PREGNANCY TEST: CPT | Mod: PBBFAC | Performed by: STUDENT IN AN ORGANIZED HEALTH CARE EDUCATION/TRAINING PROGRAM

## 2023-12-07 RX ORDER — MEDROXYPROGESTERONE ACETATE 150 MG/ML
150 INJECTION, SUSPENSION INTRAMUSCULAR
Status: DISCONTINUED | OUTPATIENT
Start: 2023-12-07 | End: 2024-03-07

## 2023-12-07 RX ORDER — CETIRIZINE HYDROCHLORIDE 10 MG/1
10 TABLET ORAL NIGHTLY
Qty: 360 TABLET | Refills: 0 | Status: SHIPPED | OUTPATIENT
Start: 2023-12-07 | End: 2024-03-07 | Stop reason: SDUPTHER

## 2023-12-07 RX ORDER — FERROUS SULFATE 325(65) MG
325 TABLET, DELAYED RELEASE (ENTERIC COATED) ORAL DAILY
Qty: 90 TABLET | Refills: 3 | Status: SHIPPED | OUTPATIENT
Start: 2023-12-07 | End: 2024-03-07 | Stop reason: SDUPTHER

## 2023-12-07 RX ADMIN — MEDROXYPROGESTERONE ACETATE 150 MG: 150 INJECTION, SUSPENSION, EXTENDED RELEASE INTRAMUSCULAR at 02:12

## 2023-12-07 SDOH — SOCIAL DETERMINANTS OF HEALTH (SDOH): OTHER PROBLEMS RELATED TO HOUSING AND ECONOMIC CIRCUMSTANCES: Z59.89

## 2023-12-07 NOTE — PROGRESS NOTES
Subjective:       Patient ID: Hawa Ortiz is a 48 y.o. female.    Chief Complaint: Follow-up (Depo shot and home stressors.)    HPI  48-year-old female presents for refills of her medications and Depo shot for abnormal uterine bleeding.  She has been on Depo-Provera for >2 years. Unknown when LMP occurred. No side effects from Depo.     Reports living situation hasn't improved. Still looking for housing for herself and daughter, however has been unsuccessful. Household members expect Ms. Shaikh to do most, if not all of the household chores. Home situation has become even more stressful. Still feels safe in home, but overwhelmed.    Objective:      Vitals:    12/07/23 1348   BP: 116/71   Pulse: 60   Resp: 18   Temp: 98.2 °F (36.8 °C)       Physical Exam  Pulmonary:      Effort: Pulmonary effort is normal.   Musculoskeletal:         General: Normal range of motion.   Skin:     General: Skin is warm.   Neurological:      Mental Status: She is alert.   Psychiatric:         Mood and Affect: Mood normal.         Assessment:       1. Abnormal uterine bleeding (AUB)    2. Encounter for contraceptive management, unspecified type    3. Distressed about housing issues    4. Iron deficiency anemia, unspecified iron deficiency anemia type    5. Seasonal allergic rhinitis, unspecified trigger        Plan:   1,2. UPT negative. Received Depo today. Return for next injection between 2.22.24-3.8.24.  3.    Patient agreeable to looking at other resources. Consulted Ms. Jen [facility], for assistance. List of resources provided to patient. Will continue to monitor.  4.  Last labs WNL. Refilled iron tablets. Advised may cause constipation and/or nausea. Take with Vit C for better absorption.  5.  Refilled Cetirizine. Adequate hydration.      RTC in 3 months for next Depo injection.    Archie Chen MD, MPH  [employer]  [facility]

## 2024-03-07 ENCOUNTER — OFFICE VISIT (OUTPATIENT)
Dept: FAMILY MEDICINE | Facility: CLINIC | Age: 49
End: 2024-03-07
Payer: MEDICAID

## 2024-03-07 VITALS
DIASTOLIC BLOOD PRESSURE: 77 MMHG | OXYGEN SATURATION: 96 % | TEMPERATURE: 98 F | SYSTOLIC BLOOD PRESSURE: 113 MMHG | WEIGHT: 199 LBS | HEIGHT: 66 IN | RESPIRATION RATE: 18 BRPM | BODY MASS INDEX: 31.98 KG/M2 | HEART RATE: 76 BPM

## 2024-03-07 DIAGNOSIS — D50.9 IRON DEFICIENCY ANEMIA, UNSPECIFIED IRON DEFICIENCY ANEMIA TYPE: ICD-10-CM

## 2024-03-07 DIAGNOSIS — M54.9 CHRONIC RIGHT-SIDED BACK PAIN, UNSPECIFIED BACK LOCATION: ICD-10-CM

## 2024-03-07 DIAGNOSIS — G43.711 CHRONIC MIGRAINE WITHOUT AURA, INTRACTABLE, WITH STATUS MIGRAINOSUS: ICD-10-CM

## 2024-03-07 DIAGNOSIS — J30.2 SEASONAL ALLERGIC RHINITIS, UNSPECIFIED TRIGGER: ICD-10-CM

## 2024-03-07 DIAGNOSIS — Z30.9 ENCOUNTER FOR CONTRACEPTIVE MANAGEMENT, UNSPECIFIED TYPE: Primary | ICD-10-CM

## 2024-03-07 DIAGNOSIS — G89.29 CHRONIC RIGHT-SIDED BACK PAIN, UNSPECIFIED BACK LOCATION: ICD-10-CM

## 2024-03-07 LAB
B-HCG UR QL: NEGATIVE
CTP QC/QA: YES

## 2024-03-07 PROCEDURE — 96372 THER/PROPH/DIAG INJ SC/IM: CPT | Mod: PBBFAC

## 2024-03-07 PROCEDURE — 99215 OFFICE O/P EST HI 40 MIN: CPT | Mod: PBBFAC,25 | Performed by: STUDENT IN AN ORGANIZED HEALTH CARE EDUCATION/TRAINING PROGRAM

## 2024-03-07 PROCEDURE — 81025 URINE PREGNANCY TEST: CPT | Mod: PBBFAC | Performed by: STUDENT IN AN ORGANIZED HEALTH CARE EDUCATION/TRAINING PROGRAM

## 2024-03-07 RX ORDER — TOPIRAMATE 100 MG/1
100 TABLET, FILM COATED ORAL 2 TIMES DAILY
Qty: 180 TABLET | Refills: 3 | Status: SHIPPED | OUTPATIENT
Start: 2024-03-07

## 2024-03-07 RX ORDER — LIDOCAINE 50 MG/G
1 PATCH TOPICAL DAILY PRN
Qty: 30 PATCH | Refills: 1 | Status: SHIPPED | OUTPATIENT
Start: 2024-03-07

## 2024-03-07 RX ORDER — ACETAMINOPHEN 500 MG
1000 TABLET ORAL EVERY 8 HOURS PRN
Qty: 180 TABLET | Refills: 0 | Status: SHIPPED | OUTPATIENT
Start: 2024-03-07

## 2024-03-07 RX ORDER — CETIRIZINE HYDROCHLORIDE 10 MG/1
10 TABLET ORAL NIGHTLY
Qty: 360 TABLET | Refills: 0 | Status: SHIPPED | OUTPATIENT
Start: 2024-03-07 | End: 2025-03-07

## 2024-03-07 RX ORDER — GABAPENTIN 300 MG/1
300 CAPSULE ORAL 3 TIMES DAILY
Qty: 270 CAPSULE | Refills: 3 | Status: SHIPPED | OUTPATIENT
Start: 2024-03-07

## 2024-03-07 RX ORDER — MEDROXYPROGESTERONE ACETATE 150 MG/ML
150 INJECTION, SUSPENSION INTRAMUSCULAR
Status: DISPENSED | OUTPATIENT
Start: 2024-03-07

## 2024-03-07 RX ORDER — MEDROXYPROGESTERONE ACETATE 150 MG/ML
150 INJECTION, SUSPENSION INTRAMUSCULAR
Qty: 1 ML | Refills: 0 | Status: SHIPPED | OUTPATIENT
Start: 2024-03-07 | End: 2024-03-07

## 2024-03-07 RX ORDER — ACETAMINOPHEN 500 MG
500 TABLET ORAL EVERY 4 HOURS PRN
COMMUNITY
Start: 2024-01-04 | End: 2024-03-07 | Stop reason: SDUPTHER

## 2024-03-07 RX ORDER — FERROUS SULFATE 325(65) MG
325 TABLET, DELAYED RELEASE (ENTERIC COATED) ORAL DAILY
Qty: 90 TABLET | Refills: 3 | Status: SHIPPED | OUTPATIENT
Start: 2024-03-07

## 2024-03-07 RX ADMIN — MEDROXYPROGESTERONE ACETATE 150 MG: 150 INJECTION, SUSPENSION INTRAMUSCULAR at 01:03

## 2024-03-07 NOTE — PROGRESS NOTES
Subjective:       Patient ID: Hawa Ortiz is a 49 y.o. female.    Chief Complaint: Contraception (Depo), Back Pain, and Medication Refill    HPI  Acute Concerns:  49-year-old female presents for follow-up.  Back pain for the past 2-3 days. Has been doing heavy lifting at home with assistance. Reports bending at knees and proper lifting technique. Used Voltaren topical one night ago with some relief. Pain exacerbated with certain movements. Affecting sleep. Denies urinary or fecal incontinence. This is acute exacerbation of chronic condition.    Chronic: On Depo Provera. Receives injections regularly.  Not sexually active.  LMP 2.28.24. Still having heavy bleeding with clots despite being on Depo. Ongoing for the past six months.    Review of Systems   Constitutional:  Negative for fever.   Musculoskeletal:  Negative for neck pain and neck stiffness.   Neurological:  Negative for headaches.       Objective:      Vitals:    03/07/24 1259   BP: 113/77   Pulse: 76   Resp: 18   Temp: 97.6 °F (36.4 °C)       Physical Exam  Cardiovascular:      Rate and Rhythm: Normal rate and regular rhythm.   Pulmonary:      Effort: Pulmonary effort is normal.      Breath sounds: Normal breath sounds.   Musculoskeletal:      Thoracic back: Tenderness present. No bony tenderness.      Lumbar back: Tenderness present. No bony tenderness.      Comments: Right paraspinal tenderness (thoracic to lumbar). No lesions, rashes or lacerations.  Forward flexion to 90 degrees.  Left side non tender.  Moves without difficulty or assistance. Non-antalgic gait.   Neurological:      Mental Status: She is alert.         Assessment:       1. Encounter for contraceptive management, unspecified type    2. Chronic right-sided back pain, unspecified back location    3. Iron deficiency anemia, unspecified iron deficiency anemia type    4. Chronic migraine without aura, intractable, with status migrainosus    5. Seasonal allergic rhinitis, unspecified  trigger        Plan:   -UPT negative.  Received Depo Provera today.  Advised heavier than normal bleeding can be S/E of Depo. Continue to continue monitoring menstrual cycles for abnormal uterine bleeding. Consider d/c Depo and switch to alternate form of contraception if bleeding becomes more worrisome.   Tylenol as needed for pain as cannot take NSAIDS due to GI bleed.    -Acute exacerbation. Provided back exercises and stretches for home. Reviewed lifting techniques. Tylenol 1000 mg every 8 hours as needed.  Lidocaine patches sent to pharmacy.  Can use heat and ice. Continue topical Voltaren. Stay active.    -Refilled iron supplements, Topamax, gabapentin and Zyrtec.    RTC in 3 months for routine Depo Provera.    Archie Chen MD, MPH  [employer] [facility]

## 2024-03-09 NOTE — PROGRESS NOTES
I reviewed History, PE, A/P and chart was reviewed.  Services provided in the outpatient department of a teaching facility, I was immediately available.  I agree with resident with exception below, care reasonable and appropriate.     Mammo 5/2023  PAP 9/2023 NIL HPV (+)    -Follow PRL - went up to 200 once but 6/2023 = 39  -Needs Ct Chest for chronic cough is not resolved  -needs FU on GI referrals placed  -thyroid US appear stable since 2016, can verify with RAD and if stability confirmed ,stop following unless new concern  -Would expect bleeding to be irreg but lessened on Depo, Pt is likely perimenopausal but in light of abnl bleeding pattern needs Pelvic US - resident messaged to order  -Had sig ROMÁN anemia in past that corrected with using Depo to suppress periods, needs marek iron panel

## 2024-03-15 ENCOUNTER — HOSPITAL ENCOUNTER (OUTPATIENT)
Dept: RADIOLOGY | Facility: HOSPITAL | Age: 49
Discharge: HOME | End: 2024-03-15
Payer: MEDICAID

## 2024-03-15 ENCOUNTER — OFFICE VISIT (OUTPATIENT)
Dept: FAMILY MEDICINE | Facility: CLINIC | Age: 49
End: 2024-03-15
Payer: MEDICAID

## 2024-03-15 VITALS
HEIGHT: 66 IN | BODY MASS INDEX: 31.98 KG/M2 | DIASTOLIC BLOOD PRESSURE: 79 MMHG | OXYGEN SATURATION: 100 % | TEMPERATURE: 98 F | SYSTOLIC BLOOD PRESSURE: 115 MMHG | HEART RATE: 63 BPM | WEIGHT: 199 LBS

## 2024-03-15 DIAGNOSIS — M79.674 TOE PAIN, RIGHT: ICD-10-CM

## 2024-03-15 DIAGNOSIS — M79.674 TOE PAIN, RIGHT: Primary | ICD-10-CM

## 2024-03-15 PROCEDURE — 99215 OFFICE O/P EST HI 40 MIN: CPT | Mod: PBBFAC,25

## 2024-03-15 PROCEDURE — 73660 X-RAY EXAM OF TOE(S): CPT | Mod: TC,RT

## 2024-03-16 NOTE — PROGRESS NOTES
"Family Medicine Clinic Note     Subjective     Patient ID: Hawa Otriz is a 49 y.o. female.    Chief Complaint: Toe Injury (Pt states x 2 days ago she had an accident and hit her right foot on a tree trunk. Pain level 9/10.  )    Pt is a 50 yo F who presents to clinic for an acute concern of toe pain.     Toe Pain:   -Was walking around yard and tripped on tree trunk hitting R foot  -Has had severe pain in her 2nd and 3rd toes since then  -Has taken half a pill of Tylenol for pain control; helped mildly  -Still has trouble with putting weight on her foot while walking   -Denied any pain in her ankle       Review of Systems   Cardiovascular:  Negative for palpitations.   Musculoskeletal:  Positive for joint pain.      Objective     Vitals:    03/15/24 1343   BP: 115/79   BP Location: Right arm   Patient Position: Sitting   BP Method: Large (Automatic)   Pulse: 63   Temp: 97.9 °F (36.6 °C)   TempSrc: Tympanic   SpO2: 100%   Weight: 90.3 kg (199 lb)   Height: 5' 6" (1.676 m)        Medication List with Changes/Refills   Current Medications    ACETAMINOPHEN (TYLENOL) 500 MG TABLET    Take 2 tablets (1,000 mg total) by mouth every 8 (eight) hours as needed for Pain.    ALBUTEROL (VENTOLIN HFA) 90 MCG/ACTUATION INHALER    Inhale 2 puffs into the lungs every 6 (six) hours as needed for Wheezing. Rescue    CETIRIZINE (ZYRTEC) 10 MG TABLET    Take 1 tablet (10 mg total) by mouth every evening.    DICLOFENAC SODIUM (VOLTAREN) 1 % GEL    Apply 2 g topically 4 (four) times daily as needed (pain). Do not exceed 32 grams/day: do not to exceed 8 grams/day/single joint of upper extremities; do not to exceed 16 grams/day/single joint of lower extremities.  Please request refill of this medication from your PCP.    FAMOTIDINE (PEPCID) 20 MG TABLET    Take 20 mg by mouth 2 (two) times daily.    FERROUS SULFATE 325 (65 FE) MG EC TABLET    Take 1 tablet (325 mg total) by mouth once daily.    FLUTICASONE PROPIONATE (FLONASE) 50 " MCG/ACTUATION NASAL SPRAY    1 spray (50 mcg total) by Each Nostril route 2 (two) times a day.    GABAPENTIN (NEURONTIN) 300 MG CAPSULE    Take 1 capsule (300 mg total) by mouth 3 (three) times daily.    LATUDA 60 MG TAB TABLET    Take 1 tablet (60 mg total) by mouth once daily.    LIDOCAINE (LIDODERM) 5 %    Place 1 patch onto the skin daily as needed. Remove & Discard patch within 12 hours or as directed by MD    OMEPRAZOLE (PRILOSEC) 20 MG CAPSULE    Take 1 capsule (20 mg total) by mouth once daily.    RIZATRIPTAN (MAXALT) 10 MG TABLET    Take 1 tablet (10 mg total) by mouth as needed for Migraine.    SODIUM CHLORIDE (SALINE MIST) 0.65 % NASAL SPRAY    1 spray by Nasal route 2 (two) times a day.    TOPIRAMATE (TOPAMAX) 100 MG TABLET    Take 1 tablet (100 mg total) by mouth 2 (two) times daily.        Physical Exam  Constitutional:       General: She is not in acute distress.     Comments: Walking with a limp on R foot   Feet:      Comments: Pt has cap refill < 2 sec on R toes. Severe bruising, swelling and tenderness present in distal interphalangeal joints on 2nd and 3rd toe. No tenderness or swelling present at the MTP joints. Limited flexion and extension of R toes when compared to L foot.   Neurological:      Mental Status: She is alert.         Assessment and Plan      1. Toe pain, right      -Will send pt for xray of toes; will follow up on results  -Discussed OTC medications for pain control; do not see need for narcotics at this time  -Will bring back in 1 week to ensure pain improving; encouraged RICE therapy until that time     Orders:  -     X-Ray Toe 2 or More Views Right; Future; Expected date: 03/15/2024      Follow up in about 1 week (around 3/22/2024) for toe pain .      Jose English MD  [facility]

## 2024-03-22 ENCOUNTER — OFFICE VISIT (OUTPATIENT)
Dept: FAMILY MEDICINE | Facility: CLINIC | Age: 49
End: 2024-03-22
Payer: MEDICAID

## 2024-03-22 VITALS
DIASTOLIC BLOOD PRESSURE: 82 MMHG | SYSTOLIC BLOOD PRESSURE: 121 MMHG | HEIGHT: 66 IN | BODY MASS INDEX: 31.69 KG/M2 | WEIGHT: 197.19 LBS | RESPIRATION RATE: 20 BRPM | OXYGEN SATURATION: 99 % | HEART RATE: 82 BPM | TEMPERATURE: 98 F

## 2024-03-22 DIAGNOSIS — M79.674 TOE PAIN, RIGHT: Primary | ICD-10-CM

## 2024-03-22 PROCEDURE — 99214 OFFICE O/P EST MOD 30 MIN: CPT | Mod: PBBFAC

## 2024-03-22 NOTE — PROGRESS NOTES
"[facility] OFFICE VISIT NOTE  Hawa Ortiz  [id number]  03/22/2024    Chief Complaint   Patient presents with    Follow-up     F/u 1 week rt toe pain ,states brusing is gone but still painful       Hawa Ortiz is a 49 y.o. female  presenting to [facility] for follow up.    R toe pain  -Onset: 2 weeks ago  -Was walking around yard and tripped on tree trunk hitting R foot  -Initially Has had severe pain in her 2nd and 3rd toes since then  -Has been taking half a pill of Tylenol for pain control; helped mildly  -No difficulty putting weight on foot   -Seen at [facility] [place] 3/15/2024; XR toes negative for acute displaced fractures or dislocations  -Report that bruises and swelling have completely gone away with improvement in pain, now rated 7/10.    Review of Systems   Musculoskeletal:  Negative for arthralgias and myalgias.        R foot, 2nd and 3rd toes: + pain   Neurological:  Negative for weakness.     Blood pressure 121/82, pulse 82, temperature 98.1 °F (36.7 °C), temperature source Oral, resp. rate 20, height 5' 6" (1.676 m), weight 89.4 kg (197 lb 3.2 oz), last menstrual period 02/28/2024, SpO2 99 %.   Physical Exam  Constitutional:       General: She is not in acute distress.  Musculoskeletal:      Right lower leg: No edema.      Left lower leg: No edema.      Comments: R foot: No bruising/swelling; mild tenderness in distal interphalangeal joints of  2nd and 3rd toe. No tenderness or swelling present at the MTP joints. Limited flexion and extension of 2nd and 3rd toes.   Skin:     General: Skin is warm.   Neurological:      Mental Status: She is alert.         Current Medications:   Current Outpatient Medications   Medication Sig Dispense Refill    acetaminophen (TYLENOL) 500 MG tablet Take 2 tablets (1,000 mg total) by mouth every 8 (eight) hours as needed for Pain. 180 tablet 0    albuterol (VENTOLIN HFA) 90 mcg/actuation inhaler Inhale 2 puffs into the lungs every 6 (six) hours as needed for Wheezing. Rescue 18 g 1 "    cetirizine (ZYRTEC) 10 MG tablet Take 1 tablet (10 mg total) by mouth every evening. 360 tablet 0    diclofenac sodium (VOLTAREN) 1 % Gel Apply 2 g topically 4 (four) times daily as needed (pain). Do not exceed 32 grams/day: do not to exceed 8 grams/day/single joint of upper extremities; do not to exceed 16 grams/day/single joint of lower extremities.  Please request refill of this medication from your PCP. 100 g 3    famotidine (PEPCID) 20 MG tablet Take 20 mg by mouth 2 (two) times daily.      ferrous sulfate 325 (65 FE) MG EC tablet Take 1 tablet (325 mg total) by mouth once daily. 90 tablet 3    fluticasone propionate (FLONASE) 50 mcg/actuation nasal spray 1 spray (50 mcg total) by Each Nostril route 2 (two) times a day. 15.8 mL 12    gabapentin (NEURONTIN) 300 MG capsule Take 1 capsule (300 mg total) by mouth 3 (three) times daily. 270 capsule 3    LATUDA 60 mg Tab tablet Take 1 tablet (60 mg total) by mouth once daily. 30 tablet 3    LIDOcaine (LIDODERM) 5 % Place 1 patch onto the skin daily as needed. Remove & Discard patch within 12 hours or as directed by MD 30 patch 1    omeprazole (PRILOSEC) 20 MG capsule Take 1 capsule (20 mg total) by mouth once daily. 90 capsule 3    rizatriptan (MAXALT) 10 MG tablet Take 1 tablet (10 mg total) by mouth as needed for Migraine. 12 tablet 2    sodium chloride (SALINE MIST) 0.65 % nasal spray 1 spray by Nasal route 2 (two) times a day. 15 mL 12    topiramate (TOPAMAX) 100 MG tablet Take 1 tablet (100 mg total) by mouth 2 (two) times daily. 180 tablet 3     Current Facility-Administered Medications   Medication Dose Route Frequency Provider Last Rate Last Admin    medroxyPROGESTERone (DEPO-PROVERA) injection 150 mg  150 mg Intramuscular Q90 Days Archie Chen MD   150 mg at 03/07/24 1349       Assessment:   1. Toe pain, right      Plan:  -XR reviewed, no signs of fractures  -Continue tylenol for pain (states can't take NSAID's due to stomach issues)  -Warm  compresses  -ED precautions given       Keep previously schedule appointment with PCP 6/6/2024; Instructed to come to the walk in clinic if worsening symptoms    Sp Chaparro  [facility] Resident

## 2024-03-25 DIAGNOSIS — N93.9 ABNORMAL UTERINE BLEEDING (AUB): Primary | ICD-10-CM

## 2024-04-05 ENCOUNTER — HOSPITAL ENCOUNTER (OUTPATIENT)
Dept: RADIOLOGY | Facility: HOSPITAL | Age: 49
Discharge: HOME | End: 2024-04-05
Attending: STUDENT IN AN ORGANIZED HEALTH CARE EDUCATION/TRAINING PROGRAM
Payer: MEDICAID

## 2024-04-05 DIAGNOSIS — N93.9 ABNORMAL UTERINE BLEEDING (AUB): ICD-10-CM

## 2024-04-05 PROCEDURE — 76830 TRANSVAGINAL US NON-OB: CPT | Mod: TC

## 2024-04-05 PROCEDURE — 76856 US EXAM PELVIC COMPLETE: CPT | Mod: TC

## 2024-04-18 NOTE — PROGRESS NOTES
Transvaginal ultrasound reviewed.  Though limited study, low suspicion for single leiomyoma as cause of her abnormal uterine bleeding.  We will investigate further.    Archie Chen MD, MPH  [facility]

## 2024-04-19 DIAGNOSIS — Z12.31 ENCOUNTER FOR SCREENING MAMMOGRAM FOR MALIGNANT NEOPLASM OF BREAST: Primary | ICD-10-CM

## 2024-04-23 ENCOUNTER — OFFICE VISIT (OUTPATIENT)
Dept: OTOLARYNGOLOGY | Facility: CLINIC | Age: 49
End: 2024-04-23
Payer: MEDICAID

## 2024-04-23 ENCOUNTER — HOSPITAL ENCOUNTER (EMERGENCY)
Facility: HOSPITAL | Age: 49
Discharge: HOME | End: 2024-04-23
Attending: EMERGENCY MEDICINE
Payer: MEDICAID

## 2024-04-23 VITALS
DIASTOLIC BLOOD PRESSURE: 80 MMHG | HEART RATE: 69 BPM | TEMPERATURE: 98 F | HEIGHT: 66 IN | OXYGEN SATURATION: 97 % | SYSTOLIC BLOOD PRESSURE: 126 MMHG | WEIGHT: 202 LBS | BODY MASS INDEX: 32.47 KG/M2

## 2024-04-23 VITALS
OXYGEN SATURATION: 100 % | HEART RATE: 67 BPM | WEIGHT: 200.63 LBS | BODY MASS INDEX: 32.38 KG/M2 | SYSTOLIC BLOOD PRESSURE: 151 MMHG | TEMPERATURE: 98 F | RESPIRATION RATE: 16 BRPM | DIASTOLIC BLOOD PRESSURE: 86 MMHG

## 2024-04-23 DIAGNOSIS — K21.9 LPRD (LARYNGOPHARYNGEAL REFLUX DISEASE): Primary | ICD-10-CM

## 2024-04-23 DIAGNOSIS — M54.41 ACUTE RIGHT-SIDED LOW BACK PAIN WITH RIGHT-SIDED SCIATICA: Primary | ICD-10-CM

## 2024-04-23 DIAGNOSIS — J30.9 ALLERGIC RHINITIS, UNSPECIFIED SEASONALITY, UNSPECIFIED TRIGGER: ICD-10-CM

## 2024-04-23 DIAGNOSIS — R13.10 DYSPHAGIA, UNSPECIFIED TYPE: ICD-10-CM

## 2024-04-23 LAB
B-HCG UR QL: NEGATIVE
CTP QC/QA: YES

## 2024-04-23 PROCEDURE — 63600175 PHARM REV CODE 636 W HCPCS: Performed by: NURSE PRACTITIONER

## 2024-04-23 PROCEDURE — 99284 EMERGENCY DEPT VISIT MOD MDM: CPT | Mod: 25,27

## 2024-04-23 PROCEDURE — 81025 URINE PREGNANCY TEST: CPT | Performed by: NURSE PRACTITIONER

## 2024-04-23 PROCEDURE — 96372 THER/PROPH/DIAG INJ SC/IM: CPT | Performed by: NURSE PRACTITIONER

## 2024-04-23 PROCEDURE — 99214 OFFICE O/P EST MOD 30 MIN: CPT | Mod: PBBFAC,25 | Performed by: STUDENT IN AN ORGANIZED HEALTH CARE EDUCATION/TRAINING PROGRAM

## 2024-04-23 RX ORDER — KETOROLAC TROMETHAMINE 30 MG/ML
30 INJECTION, SOLUTION INTRAMUSCULAR; INTRAVENOUS
Status: COMPLETED | OUTPATIENT
Start: 2024-04-23 | End: 2024-04-23

## 2024-04-23 RX ORDER — BACLOFEN 10 MG/1
10 TABLET ORAL 3 TIMES DAILY PRN
Qty: 20 TABLET | Refills: 0 | Status: SHIPPED | OUTPATIENT
Start: 2024-04-23

## 2024-04-23 RX ORDER — DICLOFENAC SODIUM 75 MG/1
75 TABLET, DELAYED RELEASE ORAL 2 TIMES DAILY PRN
Qty: 20 TABLET | Refills: 0 | Status: SHIPPED | OUTPATIENT
Start: 2024-04-23

## 2024-04-23 RX ADMIN — KETOROLAC TROMETHAMINE 30 MG: 30 INJECTION, SOLUTION INTRAMUSCULAR at 04:04

## 2024-04-23 NOTE — ED PROVIDER NOTES
Encounter Date: 4/23/2024       History     Chief Complaint   Patient presents with    Back Pain     CO LOWER BACK PAIN W RAD DOWN RT LEG X 3 DAYS. NO INJURY.      The patient presents with low back pain.  The onset was 3 days ago.  The course/duration of symptoms is constant.  Type of injury: none and none recent but reports frequent lifting at work, denies falls.  Location: Right lumbar. Radiating pain: right lower extremity. The character of symptoms is dull.  The degree at onset was moderate.  The degree at present is moderate.  There are exacerbating factors including movement and changing position.  The relieving factor is rest.  Risk factors consist of none.  Prior episodes: occasional.  Therapy today: none.  Associated symptoms: none, denies bowel dysfunction, denies bladder dysfunction, denies altered sensation, denies focal weakness, denies saddle numbness, denies abdominal pain and denies dysuria.        Review of patient's allergies indicates:   Allergen Reactions    Adhesive tape-silicones Hives     Past Medical History:   Diagnosis Date    Arthritis     Bipolar disorder     Borderline diabetes     Migraine      Past Surgical History:   Procedure Laterality Date    CHOLECYSTECTOMY      EYE SURGERY Bilateral      Family History   Problem Relation Name Age of Onset    Heart disease Mother      Aneurysm Neg Hx      Cancer Neg Hx      Clotting disorder Neg Hx      Dementia Neg Hx      Diabetes Neg Hx      Fainting Neg Hx      Hyperlipidemia Neg Hx      Kidney disease Neg Hx      Liver disease Neg Hx      Migraines Neg Hx      Neuropathy Neg Hx      Obesity Neg Hx      Parkinsonism Neg Hx      Seizures Neg Hx      Stroke Neg Hx      Tremor Neg Hx       Social History     Tobacco Use    Smoking status: Former     Types: Cigarettes    Smokeless tobacco: Never    Tobacco comments:     Quit about 1 yr ago (2023)   Substance Use Topics    Alcohol use: Not Currently    Drug use: Never     Review of Systems    Constitutional:  Negative for fever.   HENT:  Negative for sore throat.    Respiratory:  Negative for shortness of breath.    Cardiovascular:  Negative for chest pain.   Gastrointestinal:  Negative for nausea.   Genitourinary:  Negative for dysuria.   Musculoskeletal:  Positive for back pain.   Skin:  Negative for rash.   Neurological:  Negative for weakness.   Hematological:  Does not bruise/bleed easily.   All other systems reviewed and are negative.      Physical Exam     Initial Vitals [04/23/24 1505]   BP Pulse Resp Temp SpO2   (!) 151/86 67 16 97.9 °F (36.6 °C) 100 %      MAP       --         Physical Exam    Nursing note and vitals reviewed.  Constitutional: She appears well-developed and well-nourished.   HENT:   Head: Normocephalic and atraumatic.   Neck: Neck supple.   Normal range of motion.  Cardiovascular:  Normal rate, regular rhythm, normal heart sounds and intact distal pulses.           Pulmonary/Chest: Effort normal and breath sounds normal.   Abdominal: Abdomen is soft and flat. Bowel sounds are normal. There is no abdominal tenderness.   Musculoskeletal:         General: Normal range of motion.      Cervical back: Normal range of motion and neck supple.      Comments: No costovertebral angle tenderness, , Thoracic: no vertebral point tenderness, Lumbar: Right lateral mild tenderness, midline negative, no vertebral point tenderness, Sacral: no vertebral point tenderness, Testing: Straight leg raising, supine negative.  No C/T/L point tenderness. normal reflexes.        Neurological: She is alert. She has normal strength.   Skin: Skin is warm and dry.   Psychiatric: She has a normal mood and affect.         ED Course   Procedures  Labs Reviewed   POCT URINE PREGNANCY          Imaging Results              X-Ray Lumbar Spine 2 Or 3 Views (Final result)  Result time 04/23/24 16:24:29      Final result by Andrea Becker MD (04/23/24 16:24:29)                   Impression:      Diffuse degenerative  changes and mild malalignment.      Electronically signed by: Andrea Becker MD  Date:    04/23/2024  Time:    16:24               Narrative:    EXAMINATION:  XR LUMBAR SPINE 2 OR 3 VIEWS    CLINICAL HISTORY:  low back pain;    COMPARISON:  09/14/2022    FINDINGS:  There is a mild retrolisthesis of L2 on L3 and L3 on L4. There are diffuse degenerative changes no fractures are seen.                                       Medications   ketorolac injection 30 mg (30 mg Intramuscular Given 4/23/24 1610)     Medical Decision Making  The patient presents with low back pain.  The onset was 3 days ago.  The course/duration of symptoms is constant.  Type of injury: none and none recent but reports frequent lifting at work, denies falls.  Location: Right lumbar. Radiating pain: right lower extremity. The character of symptoms is dull.  The degree at onset was moderate.  The degree at present is moderate.  There are exacerbating factors including movement and changing position.  The relieving factor is rest.  Risk factors consist of none.  Prior episodes: occasional.  Therapy today: none.  Associated symptoms: none, denies bowel dysfunction, denies bladder dysfunction, denies altered sensation, denies focal weakness, denies saddle numbness, denies abdominal pain and denies dysuria.      4:40 PM DISPOSITION: The patient is resting comfortably in no acute distress.  She is hemodynamically stable and is without objective evidence for acute process requiring urgent intervention or hospitalization. I provided counseling to patient with regard to condition, the treatment plan, specific conditions for return, and the importance of follow up. Detailed written and verbal instructions provided to patient and she expressed a verbal understanding of the discharge instructions and overall management plan. Reiterated the importance of medication administration and safety and advised patient to follow up with primary care provider in 3-5 days  or sooner if needed.  Answered questions at this time. The patient is stable for discharge.       Amount and/or Complexity of Data Reviewed  Labs: ordered.  Radiology: ordered. Decision-making details documented in ED Course.    Risk  Prescription drug management.      Additional MDM:   Differential Diagnosis:   Lumbar fracture, spinal stenosis, epidural abscess, spine osteomyelitis, MS, cauda equina, among others              ED Course as of 04/23/24 1640   Tue Apr 23, 2024   1633 X-Ray Lumbar Spine 2 Or 3 Views  Impression:     Diffuse degenerative changes and mild malalignment.   [RB]      ED Course User Index  [RB] Octavio Lomeli ACNP                           Clinical Impression:  Final diagnoses:  [M54.41] Acute right-sided low back pain with right-sided sciatica (Primary)          ED Disposition Condition    Discharge Stable          ED Prescriptions       Medication Sig Dispense Start Date End Date Auth. Provider    baclofen (LIORESAL) 10 MG tablet Take 1 tablet (10 mg total) by mouth 3 (three) times daily as needed (pain or spasms). May cause drowsiness 20 tablet 4/23/2024 -- Octavio Lomeli ACNP    diclofenac (VOLTAREN) 75 MG EC tablet Take 1 tablet (75 mg total) by mouth 2 (two) times daily as needed (pain). 20 tablet 4/23/2024 -- Octavio Lomeli ACNP          Follow-up Information       Follow up With Specialties Details Why Contact Info    Archie Chen MD Family Medicine In 3 days  [address] [facility] [address]  [phone]      [facility] Emergency Medicine  If symptoms worsen [address] [facility] [address] [phone]             Octavio Lomeli ACNP  04/23/24 8026

## 2024-04-23 NOTE — PROGRESS NOTES
[facility]  Otolaryngology Clinic Note    Hawa Ortiz  Encounter Date: 4/23/2024  YOB: 1975  Physician: Walter Aguirre MD    Chief Complaint: dysphagia    HPI: Hawa Ortiz is a 49 y.o. female sent by her primary care physician for persistent dysphagia, sore throat particularly to water, difficulty with swallowing meats, intermittent hoarseness, postnasal drip, itchy eyes and nose, anterior rhinorrhea, sneezing, anterior neck discomfort.  She reports this has been as long as she can recall.  She had a thyroid ultrasound which had demonstrated thyroid nodules not meeting FNA criteria.  Her throat discomfort and nonproductive cough are worse at night prior to going to bed.  She frequently feels like solids gets stuck in her throat and require great effort to push down.    8/25/22: Here today for follow up. Continues to endorse throat pain that is worse at night.  She states she does have reflux symptoms including burning chest pain and acid taste in her mouth. Occasionally loses her voice.  She does not take any medication for this. We discussed foods to avoid however she states where she lives, other people buy the food and she has no control over it. Unfortunately she eats a lot of friend chicken and pizza.  Has been using flonase and astelin but does not feel this has helped much with her nasal symptoms.     11/29/22:  Returns for follow up.  Obtained RAST testing today, results not back yet.  Reports using flonase and astelin.  Reports she has dysphagia to meats only.  No problems with liquids.  Has not seen GI yet.  Reports she's had EGDs done before with last one being around 2000/2001.    5/30/23: Here today for follow up. She reports that she has not yet her back from GI, despite two referrals, placed both by us and internal medicine. She has not tried to call their clinic yet. She continues to have issues swallowing meats in particular and when asked about her  anxiety to eating which was noted in the SLP report, she became tearful. She reports being very fearful of not being able to swallow which gives her a lot of stress. She sees [facility] and reports being on medication for her anxiety. As far as the allergies go, she reports persistent sneezing and runny nose. She is not currently taking any allergy medicines because she says she gets overwhelmed by all of the options.     10/17/23: Here today to follow up after thyroid US, discuss allergies and dysphagia. Patient reports she has an appt with Dr. Dunham with GI in November. She is having persistent issues swallowing meats. She feels like it's getting stuck when swallowing. This does not happen with any other foods or with liquids. She denies feeling like food or drink is going down the wrong pipe and denies it making her cough. She gets tearful when asked about her trouble swallowing and reports feeling very anxious about it. She has not discussed this with her mental health provider. She endorses heartburn and is on pepcid but doesn't feel like it's helping. She often eats meals shortly before bed as well. She denies any voice changes.     4/23/24: No new changes.  Still with dysphagia mainly to meats.  Predominantly eats noodles for sustenance.  Still has not seen GI    ROS:   General: Negative except per HPI  Skin: Denies rash, ulcer, or lesion.  Eyes: Denies vision changes or diplopia.  Ears: Negative except per HPI  Nose: Negative except per HPI  Throat/mouth: Negative except per HPI  Cardiovascular: Negative except per HPI  Respiratory: Negative except per HPI  Neck: Negative except per HPI  Endocrine: Negative except per HPI  Neurologic: Negative except per HPI    Other 10-point review of systems negative except per HPI      Review of patient's allergies indicates:   Allergen Reactions    Adhesive tape-silicones Hives       Past Medical History:   Diagnosis Date    Arthritis     Bipolar disorder      Borderline diabetes     Migraine        Past Surgical History:   Procedure Laterality Date    CHOLECYSTECTOMY      EYE SURGERY Bilateral        Social History     Socioeconomic History    Marital status: Single   Tobacco Use    Smoking status: Former     Types: Cigarettes    Smokeless tobacco: Never    Tobacco comments:     Quit about 1 yr ago (2023)   Substance and Sexual Activity    Alcohol use: Not Currently    Drug use: Never    Sexual activity: Not Currently     Social Determinants of Health     Transportation Needs: No Transportation Needs (3/22/2024)    PRAPARE - Transportation     Lack of Transportation (Medical): No     Lack of Transportation (Non-Medical): No       Family History   Problem Relation Name Age of Onset    Heart disease Mother      Aneurysm Neg Hx      Cancer Neg Hx      Clotting disorder Neg Hx      Dementia Neg Hx      Diabetes Neg Hx      Fainting Neg Hx      Hyperlipidemia Neg Hx      Kidney disease Neg Hx      Liver disease Neg Hx      Migraines Neg Hx      Neuropathy Neg Hx      Obesity Neg Hx      Parkinsonism Neg Hx      Seizures Neg Hx      Stroke Neg Hx      Tremor Neg Hx         Outpatient Encounter Medications as of 4/23/2024   Medication Sig Dispense Refill    acetaminophen (TYLENOL) 500 MG tablet Take 2 tablets (1,000 mg total) by mouth every 8 (eight) hours as needed for Pain. 180 tablet 0    albuterol (VENTOLIN HFA) 90 mcg/actuation inhaler Inhale 2 puffs into the lungs every 6 (six) hours as needed for Wheezing. Rescue 18 g 1    cetirizine (ZYRTEC) 10 MG tablet Take 1 tablet (10 mg total) by mouth every evening. 360 tablet 0    famotidine (PEPCID) 20 MG tablet Take 20 mg by mouth 2 (two) times daily.      ferrous sulfate 325 (65 FE) MG EC tablet Take 1 tablet (325 mg total) by mouth once daily. 90 tablet 3    fluticasone propionate (FLONASE) 50 mcg/actuation nasal spray 1 spray (50 mcg total) by Each Nostril route 2 (two) times a day. 15.8 mL 12    gabapentin (NEURONTIN) 300  "MG capsule Take 1 capsule (300 mg total) by mouth 3 (three) times daily. 270 capsule 3    LATUDA 60 mg Tab tablet Take 1 tablet (60 mg total) by mouth once daily. 30 tablet 3    LIDOcaine (LIDODERM) 5 % Place 1 patch onto the skin daily as needed. Remove & Discard patch within 12 hours or as directed by MD 30 patch 1    omeprazole (PRILOSEC) 20 MG capsule Take 1 capsule (20 mg total) by mouth once daily. 90 capsule 3    rizatriptan (MAXALT) 10 MG tablet Take 1 tablet (10 mg total) by mouth as needed for Migraine. 12 tablet 2    sodium chloride (SALINE MIST) 0.65 % nasal spray 1 spray by Nasal route 2 (two) times a day. 15 mL 12    topiramate (TOPAMAX) 100 MG tablet Take 1 tablet (100 mg total) by mouth 2 (two) times daily. 180 tablet 3    diclofenac sodium (VOLTAREN) 1 % Gel Apply 2 g topically 4 (four) times daily as needed (pain). Do not exceed 32 grams/day: do not to exceed 8 grams/day/single joint of upper extremities; do not to exceed 16 grams/day/single joint of lower extremities.  Please request refill of this medication from your PCP. 100 g 3     Facility-Administered Encounter Medications as of 4/23/2024   Medication Dose Route Frequency Provider Last Rate Last Admin    medroxyPROGESTERone (DEPO-PROVERA) injection 150 mg  150 mg Intramuscular Q90 Days Archie Chen MD   150 mg at 03/07/24 1349       Physical Exam:  Vitals:    04/23/24 1421   BP: 126/80   BP Location: Left arm   Patient Position: Sitting   BP Method: Large (Automatic)   Pulse: 69   Temp: 98.3 °F (36.8 °C)   TempSrc: Oral   SpO2: 97%   Weight: 91.6 kg (202 lb)   Height: 5' 6" (1.676 m)       Constitutional  General Appearance: well nourished, well-developed, AAO x3, NAD  HEENT  Eyes: PEERLA, EOMI, normal conjunctivae  Ears: Hearing well at conversation level   AD: auricle normal, EAC normal, TM intact, no RAFA   AS: auricle normal, EAC normal, TM intact, no RAFA  Nose: septum midline, no inferior turbinate hypertrophy, no polyps, moist " mucosa  OC/OP: dentition poor, no oral lesions, tongue/FOM/BOT- soft, no leukoplakia/ulcerations/ tenderness  Neck: soft, non-tender, no palpable lymph nodes   Thyroid region- no nodules or goiter  Neuro: CN II - XII intact bilaterally  Cardiovascular: peripheral pulses palpable  Respiratory: non-labored respirations  Psychiatric: oriented to time, place and person, no depression, anxiety or agitation    US SOFT TISSUE HEAD NECK THYROID     CLINICAL HISTORY:  Nontoxic multinodular goiter     TECHNIQUE:  Ultrasound of the thyroid and cervical lymph nodes was performed.     COMPARISON:  May 24, 2022     FINDINGS:  Examination reveals the right hemithyroid to measure 4.7 x 1.5 x 1.7 cm left benigno thyroid measures 4.7 x 1.8 x 1.5 cm isthmus measures 3.1 mm in thickness     Subcentimeter nodules are identified in the right hemithyroid measuring the largest 6 mm x 5 mm x 4 mm other subcentimeter nodules measure 2 mm x 1 mm x 2 mm and 2 mm x 1 mm x 1 mm.     On the left anechoic cyst is again identified measuring 1.9 x 1.2 x 1.1 cm stable as compared with the previous exam no new abnormalities identified     Impression:     Subcentimeter nodules in the right hemithyroid do not meet criteria for biopsy and or surveillance.     Anechoic lesion in the left benigno thyroid likely representing a cyst stable as compared with the previous exam       Assessment/Plan:  Hawa Ortiz is a 49 y.o. female with LPRD/GERD, allergic rhinitis. Has subcentimeter right thyroid nodules, nearly 2 cm cystic nodule of left thyroid. Underwent MBSS which was normal but they did note multiple refusals to swallow due to fear/anxiety of swallowing.  She does not have any upper aerodigestive reason for her dysphagia.     - MBSS from Nov 2022 reviewed, WNL  - Zyrtec for allergies, she feels like the flonase burns and denies any nasal congestion so I discussed that she's okay to stop the flonase. Aquaphor samples given to use in her nose at night.   -  Encouraged her to speak with her mental health doctors about her anxieties towards eating as that seems to be playing into this as well.   - Thyroid cyst and subcentimeter nodules do not warrant further follow up.   - Patient has still not followed with GI.  Several referrals have been placed.  New one placed today.  Patient can return ANDREW Ortiz, PGY-4  [facility]

## 2024-05-01 NOTE — PROGRESS NOTES
I have reviewed and agree with the resident's findings, including all diagnostic interpretations and plans as written.     Lamont Wisdom M.D.

## 2024-06-20 ENCOUNTER — OFFICE VISIT (OUTPATIENT)
Dept: FAMILY MEDICINE | Facility: CLINIC | Age: 49
End: 2024-06-20
Payer: MEDICAID

## 2024-06-20 VITALS
TEMPERATURE: 98 F | WEIGHT: 193.19 LBS | HEIGHT: 66 IN | HEART RATE: 60 BPM | SYSTOLIC BLOOD PRESSURE: 117 MMHG | OXYGEN SATURATION: 97 % | DIASTOLIC BLOOD PRESSURE: 75 MMHG | RESPIRATION RATE: 20 BRPM | BODY MASS INDEX: 31.05 KG/M2

## 2024-06-20 DIAGNOSIS — G43.711 CHRONIC MIGRAINE WITHOUT AURA, INTRACTABLE, WITH STATUS MIGRAINOSUS: ICD-10-CM

## 2024-06-20 DIAGNOSIS — J30.2 SEASONAL ALLERGIC RHINITIS, UNSPECIFIED TRIGGER: ICD-10-CM

## 2024-06-20 DIAGNOSIS — Z30.9 ENCOUNTER FOR CONTRACEPTIVE MANAGEMENT, UNSPECIFIED TYPE: Primary | ICD-10-CM

## 2024-06-20 DIAGNOSIS — D50.9 IRON DEFICIENCY ANEMIA, UNSPECIFIED IRON DEFICIENCY ANEMIA TYPE: ICD-10-CM

## 2024-06-20 DIAGNOSIS — K08.109 TEETH MISSING: ICD-10-CM

## 2024-06-20 DIAGNOSIS — F31.9 BIPOLAR AFFECTIVE DISORDER, REMISSION STATUS UNSPECIFIED: ICD-10-CM

## 2024-06-20 LAB
B-HCG UR QL: NEGATIVE
CTP QC/QA: YES

## 2024-06-20 PROCEDURE — 99214 OFFICE O/P EST MOD 30 MIN: CPT | Mod: PBBFAC | Performed by: STUDENT IN AN ORGANIZED HEALTH CARE EDUCATION/TRAINING PROGRAM

## 2024-06-20 RX ORDER — FERROUS SULFATE 325(65) MG
325 TABLET, DELAYED RELEASE (ENTERIC COATED) ORAL DAILY
Qty: 90 TABLET | Refills: 3 | Status: SHIPPED | OUTPATIENT
Start: 2024-06-20

## 2024-06-20 RX ORDER — CETIRIZINE HYDROCHLORIDE 10 MG/1
10 TABLET ORAL NIGHTLY PRN
Qty: 360 TABLET | Refills: 0 | Status: SHIPPED | OUTPATIENT
Start: 2024-06-20 | End: 2025-06-20

## 2024-06-20 RX ORDER — LURASIDONE HYDROCHLORIDE 60 MG/1
60 TABLET, FILM COATED ORAL DAILY
Qty: 90 TABLET | Refills: 0 | Status: SHIPPED | OUTPATIENT
Start: 2024-06-20 | End: 2024-09-18

## 2024-06-20 RX ORDER — TOPIRAMATE 100 MG/1
100 TABLET, FILM COATED ORAL 2 TIMES DAILY
Qty: 180 TABLET | Refills: 3 | Status: SHIPPED | OUTPATIENT
Start: 2024-06-20

## 2024-06-20 RX ORDER — MEDROXYPROGESTERONE ACETATE 150 MG/ML
150 INJECTION, SUSPENSION INTRAMUSCULAR
Status: DISPENSED | OUTPATIENT
Start: 2024-06-20

## 2024-06-20 RX ADMIN — MEDROXYPROGESTERONE ACETATE 150 MG: 150 INJECTION, SUSPENSION INTRAMUSCULAR at 03:06

## 2024-06-20 NOTE — PROGRESS NOTES
Subjective:       Patient ID: Hawa Ortiz is a 49 y.o. female.    Chief Complaint: Medication Refill (Ferrous sulfate, topamax, cetirizine) and Contraception (Depo-provera)    HPI  49-year-old female presents for follow-up.    Chronic: On Depo Provera. Receives injections regularly.  Not sexually active.  Heavy bleeding has improved.    Requesting med refills.     Review of Systems   Constitutional:  Negative for fever.   Musculoskeletal:  Negative for neck pain and neck stiffness.   Neurological:  Negative for headaches.     Objective:      Vitals:    06/20/24 1519   BP: 117/75   Pulse: 60   Resp: 20   Temp: 97.8 °F (36.6 °C)       Physical Exam    Gen: Alert and comfortable. NAD.  Mouth: Noticeable changes in smile. Has missing teeth along maxilla.  MSK: ROM WNL. Walks unassisted.  Skin: No lesions appreciated on exposed skin.  Assessment:       1. Encounter for contraceptive management, unspecified type    2. Teeth missing    3. Iron deficiency anemia, unspecified iron deficiency anemia type    4. Seasonal allergic rhinitis, unspecified trigger    5. Chronic migraine without aura, intractable, with status migrainosus    6. Bipolar affective disorder, remission status unspecified        Plan:     -UPT negative.  Received Depo Provera today.  Advised heavier than normal bleeding can be S/E of Depo. Continue monitoring menstrual cycles for abnormal uterine bleeding. Consider d/c Depo and switch to alternate form of contraception if bleeding becomes more worrisome.     - Concern for new health concerns given recent dentition changes. Dental visit recommended ASAP. Patient notified of concerns via phone.    -Refilled iron supplements, Topamax, Latuda and Zyrtec.    Future Appointments   Date Time Provider Department Center   9/26/2024  1:00 PM Amina Bethea FNP [facility] GASTRO [facility]   9/26/2024  2:20 PM Delbert Cardona MD [facility] FM RES [facility]     Archie Chen MD, MPH  [facility]

## 2024-08-08 ENCOUNTER — OFFICE VISIT (OUTPATIENT)
Dept: FAMILY MEDICINE | Facility: CLINIC | Age: 49
End: 2024-08-08
Payer: MEDICAID

## 2024-08-08 VITALS
SYSTOLIC BLOOD PRESSURE: 104 MMHG | RESPIRATION RATE: 20 BRPM | TEMPERATURE: 98 F | HEART RATE: 64 BPM | BODY MASS INDEX: 29.95 KG/M2 | OXYGEN SATURATION: 98 % | HEIGHT: 66 IN | DIASTOLIC BLOOD PRESSURE: 75 MMHG | WEIGHT: 186.38 LBS

## 2024-08-08 DIAGNOSIS — Z12.11 COLON CANCER SCREENING: ICD-10-CM

## 2024-08-08 DIAGNOSIS — Z12.39 ENCOUNTER FOR SCREENING FOR MALIGNANT NEOPLASM OF BREAST, UNSPECIFIED SCREENING MODALITY: ICD-10-CM

## 2024-08-08 DIAGNOSIS — M79.675 GREAT TOE PAIN, LEFT: Primary | ICD-10-CM

## 2024-08-08 PROCEDURE — 99215 OFFICE O/P EST HI 40 MIN: CPT | Mod: PBBFAC

## 2024-08-09 DIAGNOSIS — Z12.11 COLON CANCER SCREENING: Primary | ICD-10-CM

## 2024-08-09 RX ORDER — POLYETHYLENE GLYCOL 3350, SODIUM SULFATE, SODIUM CHLORIDE, POTASSIUM CHLORIDE, SODIUM ASCORBATE, AND ASCORBIC ACID 7.5-2.691G
KIT ORAL
Qty: 1 KIT | Refills: 0 | Status: SHIPPED | OUTPATIENT
Start: 2024-08-09

## 2024-08-12 ENCOUNTER — HOSPITAL ENCOUNTER (OUTPATIENT)
Dept: RADIOLOGY | Facility: HOSPITAL | Age: 49
Discharge: HOME | End: 2024-08-12
Payer: MEDICAID

## 2024-08-12 DIAGNOSIS — Z12.39 ENCOUNTER FOR SCREENING FOR MALIGNANT NEOPLASM OF BREAST, UNSPECIFIED SCREENING MODALITY: ICD-10-CM

## 2024-08-12 PROCEDURE — 77067 SCR MAMMO BI INCL CAD: CPT | Mod: 26,,, | Performed by: RADIOLOGY

## 2024-08-12 PROCEDURE — 77063 BREAST TOMOSYNTHESIS BI: CPT | Mod: 26,,, | Performed by: RADIOLOGY

## 2024-08-12 PROCEDURE — 77067 SCR MAMMO BI INCL CAD: CPT | Mod: TC

## 2024-08-13 ENCOUNTER — TELEPHONE (OUTPATIENT)
Dept: FAMILY MEDICINE | Facility: CLINIC | Age: 49
End: 2024-08-13
Payer: MEDICAID

## 2024-08-13 NOTE — TELEPHONE ENCOUNTER
Called patient, confirmed identity with , informed patient with mammogram results, patient understood the results with no questions. Still has toe pain from previous visit, has not improved since last week. Counseled patient that it might take a couple of months for the nail to heal and grow. Notified patient if pain worsens or if toenail continues to worsen please call the office to setup an earlier appointment.

## 2024-08-21 ENCOUNTER — ANESTHESIA EVENT (OUTPATIENT)
Dept: ENDOSCOPY | Facility: HOSPITAL | Age: 49
End: 2024-08-21
Payer: MEDICAID

## 2024-08-21 NOTE — ANESTHESIA PREPROCEDURE EVALUATION
"                                                                                                             08/21/2024  Hawa Ortiz is a 49 y.o., female.with PMHx of GERD, anxiety/depression/bipolar presents For screening CLN    Active Ambulatory Problems     Diagnosis Date Noted    Allergic rhinitis 06/21/2022    LPRD (laryngopharyngeal reflux disease) 06/21/2022    Lumbar pain 09/13/2022    Radiculopathy 09/13/2022    Depression 09/13/2022    Chronic migraine without aura, intractable, with status migrainosus 10/13/2022    Pain 04/05/2023    Class 1 obesity with serious comorbidity and body mass index (BMI) of 32.0 to 32.9 in adult 11/15/2023    Dysphagia 04/23/2024     Resolved Ambulatory Problems     Diagnosis Date Noted    No Resolved Ambulatory Problems     Past Medical History:   Diagnosis Date    Arthritis     Bipolar disorder     Borderline diabetes     Migraine      Pre-op Assessment    I have reviewed the NPO Status.      Review of Systems  Anesthesia Hx:  No problems with previous Anesthesia                Social:  Non-Smoker       Cardiovascular:  Cardiovascular Normal                                            Pulmonary:  Pulmonary Normal                       Renal/:  Renal/ Normal                 Hepatic/GI:  Bowel Prep.   GERD, well controlled             Neurological:  Neurology Normal                                      Endocrine:  Endocrine Normal            Psych:  Psychiatric History anxiety depression              Vitals:    08/22/24 0920   BP: 138/75   BP Location: Right arm   Patient Position: Lying   Pulse: 60   Resp: 18   Temp: 37.7 °C (99.9 °F)   TempSrc: Oral   SpO2: 99%   Weight: 83.5 kg (184 lb)   Height: 5' 6" (1.676 m)         Physical Exam  General: Alert, Cooperative and Well nourished    Airway:  Mallampati: II   Mouth Opening: Normal  TM Distance: Normal  Tongue: Normal  Neck ROM: Normal ROM    Dental:  Intact    Chest/Lungs:  Clear to auscultation, Normal Respiratory " Rate    Heart:  Rate: Normal  Rhythm: Regular Rhythm  Sounds: Normal       Latest Reference Range & Units 08/22/24 09:11   hCG Qualitative, Urine Negative  Negative     Lab Results   Component Value Date    WBC 5.14 08/15/2023    HGB 14.8 08/15/2023    HCT 46.7 08/15/2023    MCV 89.8 08/15/2023     08/15/2023       CMP  Sodium   Date Value Ref Range Status   06/05/2023 139 136 - 145 mmol/L Final     Potassium   Date Value Ref Range Status   06/05/2023 3.9 3.5 - 5.1 mmol/L Final     Chloride   Date Value Ref Range Status   06/05/2023 113 (H) 98 - 107 mmol/L Final     CO2   Date Value Ref Range Status   06/05/2023 20 (L) 22 - 29 mmol/L Final     Blood Urea Nitrogen   Date Value Ref Range Status   06/05/2023 7.0 7.0 - 18.7 mg/dL Final     Creatinine   Date Value Ref Range Status   06/05/2023 0.94 0.55 - 1.02 mg/dL Final     Calcium   Date Value Ref Range Status   06/05/2023 9.1 8.4 - 10.2 mg/dL Final     Albumin   Date Value Ref Range Status   06/05/2023 4.0 3.5 - 5.0 g/dL Final     Bilirubin Total   Date Value Ref Range Status   06/05/2023 0.3 <=1.5 mg/dL Final     ALP   Date Value Ref Range Status   06/05/2023 59 40 - 150 unit/L Final     AST   Date Value Ref Range Status   06/05/2023 20 5 - 34 unit/L Final     ALT   Date Value Ref Range Status   06/05/2023 16 0 - 55 unit/L Final     eGFR   Date Value Ref Range Status   06/05/2023 >60 mls/min/1.73/m2 Final         Anesthesia Plan  Type of Anesthesia, risks & benefits discussed:    Anesthesia Type: Gen Natural Airway  Intra-op Monitoring Plan: Standard ASA Monitors  Post Op Pain Control Plan: IV/PO Opioids PRN  Induction:  IV  Informed Consent: Informed consent signed with the Patient and all parties understand the risks and agree with anesthesia plan.  All questions answered.   ASA Score: 2  Day of Surgery Review of History & Physical: H&P Update referred to the surgeon/provider.    Ready For Surgery From Anesthesia Perspective.     .

## 2024-08-22 ENCOUNTER — ANESTHESIA (OUTPATIENT)
Dept: ENDOSCOPY | Facility: HOSPITAL | Age: 49
End: 2024-08-22
Payer: MEDICAID

## 2024-08-22 ENCOUNTER — HOSPITAL ENCOUNTER (OUTPATIENT)
Facility: HOSPITAL | Age: 49
Discharge: HOME | End: 2024-08-22
Attending: INTERNAL MEDICINE | Admitting: INTERNAL MEDICINE
Payer: MEDICAID

## 2024-08-22 DIAGNOSIS — Z12.11 COLON CANCER SCREENING: Primary | ICD-10-CM

## 2024-08-22 DIAGNOSIS — K57.30 DIVERTICULOSIS LARGE INTESTINE W/O PERFORATION OR ABSCESS W/O BLEEDING: ICD-10-CM

## 2024-08-22 LAB
B-HCG UR QL: NEGATIVE
CTP QC/QA: YES

## 2024-08-22 PROCEDURE — 63600175 PHARM REV CODE 636 W HCPCS: Performed by: SPECIALIST

## 2024-08-22 PROCEDURE — 37000009 HC ANESTHESIA EA ADD 15 MINS: Performed by: INTERNAL MEDICINE

## 2024-08-22 PROCEDURE — 63600175 PHARM REV CODE 636 W HCPCS: Performed by: NURSE ANESTHETIST, CERTIFIED REGISTERED

## 2024-08-22 PROCEDURE — G0121 COLON CA SCRN NOT HI RSK IND: HCPCS | Performed by: INTERNAL MEDICINE

## 2024-08-22 PROCEDURE — 81025 URINE PREGNANCY TEST: CPT | Performed by: INTERNAL MEDICINE

## 2024-08-22 PROCEDURE — 37000008 HC ANESTHESIA 1ST 15 MINUTES: Performed by: INTERNAL MEDICINE

## 2024-08-22 PROCEDURE — D9220A PRA ANESTHESIA: Mod: ,,, | Performed by: NURSE ANESTHETIST, CERTIFIED REGISTERED

## 2024-08-22 PROCEDURE — 45378 DIAGNOSTIC COLONOSCOPY: CPT | Mod: ,,, | Performed by: INTERNAL MEDICINE

## 2024-08-22 PROCEDURE — 25000003 PHARM REV CODE 250: Performed by: NURSE ANESTHETIST, CERTIFIED REGISTERED

## 2024-08-22 RX ORDER — SODIUM CHLORIDE, SODIUM LACTATE, POTASSIUM CHLORIDE, CALCIUM CHLORIDE 600; 310; 30; 20 MG/100ML; MG/100ML; MG/100ML; MG/100ML
INJECTION, SOLUTION INTRAVENOUS CONTINUOUS
Status: DISCONTINUED | OUTPATIENT
Start: 2024-08-22 | End: 2024-08-22 | Stop reason: HOSPADM

## 2024-08-22 RX ORDER — PROPOFOL 10 MG/ML
INJECTION, EMULSION INTRAVENOUS
Status: DISCONTINUED | OUTPATIENT
Start: 2024-08-22 | End: 2024-08-22

## 2024-08-22 RX ORDER — LIDOCAINE HYDROCHLORIDE 20 MG/ML
INJECTION INTRAVENOUS
Status: DISCONTINUED | OUTPATIENT
Start: 2024-08-22 | End: 2024-08-22

## 2024-08-22 RX ADMIN — PROPOFOL 50 MG: 10 INJECTION, EMULSION INTRAVENOUS at 10:08

## 2024-08-22 RX ADMIN — PROPOFOL 25 MG: 10 INJECTION, EMULSION INTRAVENOUS at 11:08

## 2024-08-22 RX ADMIN — SODIUM CHLORIDE, POTASSIUM CHLORIDE, SODIUM LACTATE AND CALCIUM CHLORIDE: 600; 310; 30; 20 INJECTION, SOLUTION INTRAVENOUS at 09:08

## 2024-08-22 RX ADMIN — LIDOCAINE HYDROCHLORIDE 50 MG: 20 INJECTION INTRAVENOUS at 10:08

## 2024-08-22 RX ADMIN — PROPOFOL 25 MG: 10 INJECTION, EMULSION INTRAVENOUS at 10:08

## 2024-08-22 RX ADMIN — PROPOFOL 75 MG: 10 INJECTION, EMULSION INTRAVENOUS at 10:08

## 2024-08-22 NOTE — H&P
History and Physical    Patient Name: Hawa Ortiz  MRN: [id number]  : 1975  Date of Procedure:  2024  Referring Physician: Ghislaine Beck MD  Primary Physician: Ghislaine Beck MD  Procedure Physician: Dr. Hernandez    Procedure - colonoscopy  ASA - per anesthesia  Mallampati - per anesthesia  History of Anesthesia problems - no  Family history Anesthesia problems -  no   Plan of anesthesia - General    Diagnosis: screening colonoscopy  Chief Complaint: Same as above    HPI: Patient is an 49 y.o. female is here for the above. She had an attempted colonoscopy last year but was unable to finish the prep. Patient denies any recent nausea, vomiting, abdominal pain, abnormal bowel movements, or unintentional weight loss. Denies any family history of colon cancer or polyps.     Last colonoscopy: never    ROS:  Constitutional: No fevers, chills, No weight loss  CV: No chest pain  Pulm: No cough, No shortness of breath  GI: see HPI    Medical History:   Past Medical History:   Diagnosis Date    Arthritis     Bipolar disorder     Borderline diabetes     Migraine      Surgical History:   Past Surgical History:   Procedure Laterality Date    CHOLECYSTECTOMY      EYE SURGERY Bilateral      Family History:   Family History   Problem Relation Name Age of Onset    Heart disease Mother      Heart disease Father      Aneurysm Neg Hx      Cancer Neg Hx      Clotting disorder Neg Hx      Dementia Neg Hx      Diabetes Neg Hx      Fainting Neg Hx      Hyperlipidemia Neg Hx      Kidney disease Neg Hx      Liver disease Neg Hx      Migraines Neg Hx      Neuropathy Neg Hx      Obesity Neg Hx      Parkinsonism Neg Hx      Seizures Neg Hx      Stroke Neg Hx      Tremor Neg Hx     .  Social History:   Social History     Socioeconomic History    Marital status: Single   Tobacco Use    Smoking status: Former     Types: Cigarettes    Smokeless tobacco: Never    Tobacco comments:     Quit about 1 yr ago ()   Substance and Sexual  Activity    Alcohol use: Not Currently    Drug use: Never    Sexual activity: Not Currently     Social Determinants of Health     Transportation Needs: No Transportation Needs (3/22/2024)    PRAPARE - Transportation     Lack of Transportation (Medical): No     Lack of Transportation (Non-Medical): No       Review of patient's allergies indicates:   Allergen Reactions    Adhesive tape-silicones Hives       Medications:   Facility-Administered Medications Prior to Admission   Medication Dose Route Frequency Provider Last Rate Last Admin    medroxyPROGESTERone (DEPO-PROVERA) injection 150 mg  150 mg Intramuscular Q90 Days    150 mg at 06/20/24 1541     Medications Prior to Admission   Medication Sig Dispense Refill Last Dose    albuterol (VENTOLIN HFA) 90 mcg/actuation inhaler Inhale 2 puffs into the lungs every 6 (six) hours as needed for Wheezing. Rescue 18 g 1 Taking    cetirizine (ZYRTEC) 10 MG tablet Take 1 tablet (10 mg total) by mouth nightly as needed for Allergies. 360 tablet 0 Taking    ferrous sulfate 325 (65 FE) MG EC tablet Take 1 tablet (325 mg total) by mouth once daily. Switch to every other day IF constipated. 90 tablet 3 Taking    fluticasone propionate (FLONASE) 50 mcg/actuation nasal spray 1 spray (50 mcg total) by Each Nostril route 2 (two) times a day. 15.8 mL 12 Taking    gabapentin (NEURONTIN) 300 MG capsule Take 1 capsule (300 mg total) by mouth 3 (three) times daily. 270 capsule 3 Taking    LATUDA 60 mg Tab tablet Take 1 tablet (60 mg total) by mouth once daily. 90 tablet 0 Taking    rizatriptan (MAXALT) 10 MG tablet Take 1 tablet (10 mg total) by mouth as needed for Migraine. 12 tablet 2 Taking    sodium chloride (SALINE MIST) 0.65 % nasal spray 1 spray by Nasal route 2 (two) times a day. 15 mL 12 Taking    topiramate (TOPAMAX) 100 MG tablet Take 1 tablet (100 mg total) by mouth 2 (two) times daily. 180 tablet 3 Taking    LIDOcaine (LIDODERM) 5 % Place 1 patch onto the skin daily as needed.  "Remove & Discard patch within 12 hours or as directed by MD 30 patch 1     omeprazole (PRILOSEC) 20 MG capsule Take 1 capsule (20 mg total) by mouth once daily. (Patient not taking: Reported on 8/15/2024) 90 capsule 3 Not Taking    polyethylene glycol (MOVIPREP) 100-7.5-2.691 gram solution Take as directed prior to colonoscopy 1 kit 0      Physical Exam:  Vital Signs: There were no vitals filed for this visit.  LMP 07/12/2024 (Approximate) Comment: neg preg test    General: NAD  Lungs: NWOB on RA. Symmetric chest rise and fall  Heart: RR  Abdomen: Soft, NT, ND    Labs:  Lab Results   Component Value Date    WBC 5.14 08/15/2023    HGB 14.8 08/15/2023    HCT 46.7 08/15/2023    MCV 89.8 08/15/2023     08/15/2023     No results found for: "INR", "PT", "APTT"  Lab Results   Component Value Date     06/05/2023    K 3.9 06/05/2023    CO2 20 (L) 06/05/2023    BUN 7.0 06/05/2023    CREATININE 0.94 06/05/2023    LABPROT 7.6 06/05/2023    ALBUMIN 4.0 06/05/2023    BILITOT 0.3 06/05/2023    ALKPHOS 59 06/05/2023    ALT 16 06/05/2023    AST 20 06/05/2023       Assessment and Plan:   Patient is a 49 year old female with PMH of bipolar and migraines who presents today for colonoscopy.     - After discussing risks, benefits, and alternatives, patient elects to proceed with colonoscopy, and any other indicated procedures. All questions and concerns addressed. Written and verbal consent obtained.     Alena Hartman MD  [employer], PGY2    "

## 2024-08-22 NOTE — ANESTHESIA POSTPROCEDURE EVALUATION
Anesthesia Post Evaluation    Patient: Hawa Ortiz    Procedure(s) Performed: Procedure(s) (LRB):  COLONOSCOPY (N/A)    Final Anesthesia Type: general      Patient location during evaluation: GI PACU  Patient participation: Yes- Able to Participate  Level of consciousness: awake and alert  Post-procedure vital signs: reviewed and stable  Pain management: adequate  Airway patency: patent    PONV status at discharge: No PONV  Anesthetic complications: no      Cardiovascular status: hemodynamically stable  Respiratory status: unassisted, spontaneous ventilation and room air  Hydration status: euvolemic  Follow-up not needed.              Vitals Value Taken Time   /75 08/22/24 0920   Temp 37.7 °C (99.9 °F) 08/22/24 0920   Pulse 60 08/22/24 0920   Resp 18 08/22/24 0920   SpO2 99 % 08/22/24 0920         No case tracking events are documented in the log.      Pain/Ezequiel Score: No data recorded

## 2024-08-22 NOTE — TRANSFER OF CARE
Anesthesia Transfer of Care Note    Patient: Hawa Ortiz    Procedure(s) Performed: Procedure(s) (LRB):  COLONOSCOPY (N/A)    Patient location: GI    Anesthesia Type: general    Post pain: adequate analgesia    Post assessment: no apparent anesthetic complications    Post vital signs: stable    Level of consciousness: awake    Nausea/Vomiting: no nausea/vomiting    Complications: none    Transfer of care protocol was followedComments: Report to Jose M BRITO      Last vitals: 106/64 p 66 r 22 sat 100 ra

## 2024-08-22 NOTE — DISCHARGE SUMMARY
[facility] - End[facility]  Discharge Note  Short Stay    Procedure(s) (LRB):  COLONOSCOPY (N/A)    The procedure of colonoscopy was explained to the patient and consent obtained.  The patient was transferred to the endoscopy suite, general IV anesthesia was provided by anesthesia Services.  Rectal exam was performed and normal.  The Olympus videocolonoscope was introduced per rectum and advanced around the colon to the cecum.  The ileocecal valve and appendiceal orifice were identified and normal.  Careful examination of the ascending, transverse and descending colon revealed no abnormalities.  There were few scattered large and small diverticula noted in the descending and sigmoid colon.  The rectum was normal including retroflexed view.  The endoscope was withdrawn.  Withdrawal time cecum to rectum 19 minutes.  The procedure was well tolerated and the patient returned to the recovery area for observation.      Discharge plan-the patient can resume his regular diet today and normal activities tomorrow.  A follow-up colonoscopy in 10 years is recommended.  OUTCOME: Patient tolerated treatment/procedure well without complication and is now ready for discharge.    DISPOSITION: Home or Self Care    FINAL DIAGNOSIS:  <principal problem not specified>    FOLLOWUP: With primary care provider    DISCHARGE INSTRUCTIONS:    Discharge Procedure Orders   Diet general     Notify your health care provider if you experience any of the following:  persistent nausea and vomiting or diarrhea     Notify your health care provider if you experience any of the following:  severe uncontrolled pain     Call MD for:  temperature >100.4     Call MD for:  persistent nausea and vomiting     Call MD for:  severe uncontrolled pain     Call MD for:  difficulty breathing, headache or visual disturbances     Activity as tolerated         Clinical Reference Documents Added to Patient Instructions         Document    COLONOSCOPY DISCHARGE  INSTRUCTIONS (ENGLISH)            TIME SPENT ON DISCHARGE: 5 minutes

## 2024-08-22 NOTE — PROVATION PATIENT INSTRUCTIONS
Discharge Summary/Instructions after an Endoscopic Procedure  Patient Name: Hawa Ortiz  Patient MRN: [id number]  Patient YOB: 1975 Thursday, August 22, 2024  Modesto Hernandez MD  Dear patient,  As a result of recent federal legislation (The Federal Cures Act), you may   receive lab or pathology results from your procedure in your [facility]   account before your physician is able to contact you. Your physician or   their representative will relay the results to you with their   recommendations at their soonest availability.  Thank you,  RESTRICTIONS:  During your procedure today, you received medications for sedation.  These   medications may affect your judgment, balance and coordination.  Therefore,   for 24 hours, you have the following restrictions:   - DO NOT drive a car, operate machinery, make legal/financial decisions,   sign important papers or drink alcohol.    ACTIVITY:  Today: no heavy lifting, straining or running due to procedural   sedation/anesthesia.  The following day: return to full activity including work.  DIET:  Eat and drink normally unless instructed otherwise.     TREATMENT FOR COMMON SIDE EFFECTS:  - Mild abdominal pain, nausea, belching, bloating or excessive gas:  rest,   eat lightly and use a heating pad.  - Sore Throat: treat with throat lozenges and/or gargle with warm salt   water.  - Because air was used during the procedure, expelling large amounts of air   from your rectum or belching is normal.  - If a bowel prep was taken, you may not have a bowel movement for 1-3 days.    This is normal.  SYMPTOMS TO WATCH FOR AND REPORT TO YOUR PHYSICIAN:  1. Abdominal pain or bloating, other than gas cramps.  2. Chest pain.  3. Back pain.  4. Signs of infection such as: chills or fever occurring within 24 hours   after the procedure.  5. Rectal bleeding, which would show as bright red, maroon, or black stools.   (A tablespoon of blood from the rectum is not serious, especially  if   hemorrhoids are present.)  6. Vomiting.  7. Weakness or dizziness.  GO DIRECTLY TO THE NEAREST EMERGENCY ROOM IF YOU HAVE ANY OF THE FOLLOWING:      Difficulty breathing              Chills and/or fever over 101 F   Persistent vomiting and/or vomiting blood   Severe abdominal pain   Severe chest pain   Black, tarry stools   Bleeding- more than one tablespoon   Any other symptom or condition that you feel may need urgent attention  Your doctor recommends these additional instructions:  If any biopsies were taken, your doctors clinic will contact you in 1 to 2   weeks with any results.  - Discharge patient to home (ambulatory).   - Resume previous diet today.   - Repeat colonoscopy in 10 years for screening purposes.   - Continue present medications.  For questions, problems or results please call your physician - Modesto Hernandez MD at Work:  [phone].  [facility] , EMERGENCY ROOM PHONE NUMBER: [phone]  IF A COMPLICATION OR EMERGENCY SITUATION ARISES AND YOU ARE UNABLE TO REACH   YOUR PHYSICIAN - GO DIRECTLY TO THE EMERGENCY ROOM.  MD Modesto Galaviz MD  8/22/2024 11:24:23 AM  This report has been verified and signed electronically.  Dear patient,  As a result of recent federal legislation (The Federal Cures Act), you may   receive lab or pathology results from your procedure in your [facility]   account before your physician is able to contact you. Your physician or   their representative will relay the results to you with their   recommendations at their soonest availability.  Thank you,  PROVATION

## 2024-08-23 VITALS
TEMPERATURE: 100 F | DIASTOLIC BLOOD PRESSURE: 68 MMHG | RESPIRATION RATE: 18 BRPM | HEART RATE: 65 BPM | SYSTOLIC BLOOD PRESSURE: 111 MMHG | OXYGEN SATURATION: 97 % | HEIGHT: 66 IN | BODY MASS INDEX: 29.57 KG/M2 | WEIGHT: 184 LBS

## 2024-09-26 ENCOUNTER — OFFICE VISIT (OUTPATIENT)
Dept: GASTROENTEROLOGY | Facility: CLINIC | Age: 49
End: 2024-09-26
Payer: MEDICAID

## 2024-09-26 VITALS
HEART RATE: 65 BPM | HEIGHT: 66 IN | TEMPERATURE: 98 F | BODY MASS INDEX: 29.99 KG/M2 | OXYGEN SATURATION: 99 % | SYSTOLIC BLOOD PRESSURE: 130 MMHG | WEIGHT: 186.63 LBS | RESPIRATION RATE: 16 BRPM | DIASTOLIC BLOOD PRESSURE: 88 MMHG

## 2024-09-26 DIAGNOSIS — K21.9 GASTROESOPHAGEAL REFLUX DISEASE, UNSPECIFIED WHETHER ESOPHAGITIS PRESENT: ICD-10-CM

## 2024-09-26 DIAGNOSIS — R13.10 DYSPHAGIA, UNSPECIFIED TYPE: Primary | ICD-10-CM

## 2024-09-26 DIAGNOSIS — R13.10 DYSPHAGIA, UNSPECIFIED TYPE: ICD-10-CM

## 2024-09-26 DIAGNOSIS — Z12.11 SCREENING FOR COLON CANCER: ICD-10-CM

## 2024-09-26 PROCEDURE — 3075F SYST BP GE 130 - 139MM HG: CPT | Mod: CPTII,,, | Performed by: NURSE PRACTITIONER

## 2024-09-26 PROCEDURE — 1160F RVW MEDS BY RX/DR IN RCRD: CPT | Mod: CPTII,,, | Performed by: NURSE PRACTITIONER

## 2024-09-26 PROCEDURE — 1159F MED LIST DOCD IN RCRD: CPT | Mod: CPTII,,, | Performed by: NURSE PRACTITIONER

## 2024-09-26 PROCEDURE — 3008F BODY MASS INDEX DOCD: CPT | Mod: CPTII,,, | Performed by: NURSE PRACTITIONER

## 2024-09-26 PROCEDURE — 3079F DIAST BP 80-89 MM HG: CPT | Mod: CPTII,,, | Performed by: NURSE PRACTITIONER

## 2024-09-26 PROCEDURE — 99204 OFFICE O/P NEW MOD 45 MIN: CPT | Mod: S$PBB,,, | Performed by: NURSE PRACTITIONER

## 2024-09-26 PROCEDURE — 99215 OFFICE O/P EST HI 40 MIN: CPT | Mod: PBBFAC | Performed by: NURSE PRACTITIONER

## 2024-09-26 RX ORDER — OMEPRAZOLE 20 MG/1
40 CAPSULE, DELAYED RELEASE ORAL DAILY
Qty: 90 CAPSULE | Refills: 3 | Status: SHIPPED | OUTPATIENT
Start: 2024-09-26 | End: 2024-09-26 | Stop reason: SDUPTHER

## 2024-09-26 RX ORDER — OMEPRAZOLE 40 MG/1
40 CAPSULE, DELAYED RELEASE ORAL DAILY
Qty: 30 CAPSULE | Refills: 5 | Status: SHIPPED | OUTPATIENT
Start: 2024-09-26

## 2024-09-26 NOTE — ASSESSMENT & PLAN NOTE
GERD lifestyle modifications  Reflux precautions  Limit NSAID use  EGD  Increase omeprazole to 40 mg daily  Call with updates  F/u clinic visit with NP in 6 months (after date of scheduled procedure)

## 2024-09-26 NOTE — ASSESSMENT & PLAN NOTE
She underwent colonoscopy August 22, 2024 with findings of diverticulosis in the sigmoid colon, descending colon, and otherwise unremarkable exam with no specimens collected and a recommended recall of 10 years.

## 2024-09-26 NOTE — TELEPHONE ENCOUNTER
----- Message from Beatriz Kaufman sent at 9/26/2024  2:34 PM CDT -----  Regarding: Medication Correction  Pharmacy is calling the prescription for omeprazole 20 mg Oral Daily is not covered but if they change it 40 it will covered by her insurance. They needed the okay to change it.    Callback [phone]

## 2024-10-03 ENCOUNTER — OFFICE VISIT (OUTPATIENT)
Dept: FAMILY MEDICINE | Facility: CLINIC | Age: 49
End: 2024-10-03
Payer: MEDICAID

## 2024-10-03 VITALS
RESPIRATION RATE: 18 BRPM | DIASTOLIC BLOOD PRESSURE: 80 MMHG | HEIGHT: 66 IN | HEART RATE: 65 BPM | OXYGEN SATURATION: 99 % | TEMPERATURE: 98 F | WEIGHT: 186.38 LBS | BODY MASS INDEX: 29.95 KG/M2 | SYSTOLIC BLOOD PRESSURE: 119 MMHG

## 2024-10-03 DIAGNOSIS — B07.9 VIRAL WARTS, UNSPECIFIED TYPE: ICD-10-CM

## 2024-10-03 DIAGNOSIS — Z30.42 DEPO-PROVERA CONTRACEPTIVE STATUS: Primary | ICD-10-CM

## 2024-10-03 DIAGNOSIS — Z12.4 PAP SMEAR FOR CERVICAL CANCER SCREENING: ICD-10-CM

## 2024-10-03 DIAGNOSIS — M54.50 LUMBAR PAIN: ICD-10-CM

## 2024-10-03 LAB
B-HCG UR QL: NEGATIVE
CTP QC/QA: YES

## 2024-10-03 PROCEDURE — 99214 OFFICE O/P EST MOD 30 MIN: CPT | Mod: PBBFAC,25

## 2024-10-03 PROCEDURE — 96372 THER/PROPH/DIAG INJ SC/IM: CPT | Mod: PBBFAC

## 2024-10-03 RX ORDER — GABAPENTIN 300 MG/1
600 CAPSULE ORAL 3 TIMES DAILY
Qty: 270 CAPSULE | Refills: 3 | Status: SHIPPED | OUTPATIENT
Start: 2024-10-03 | End: 2024-10-03

## 2024-10-03 RX ORDER — MEDROXYPROGESTERONE ACETATE 150 MG/ML
150 INJECTION, SUSPENSION INTRAMUSCULAR
Status: COMPLETED | OUTPATIENT
Start: 2024-10-03 | End: 2024-10-03

## 2024-10-03 RX ORDER — GABAPENTIN 300 MG/1
CAPSULE ORAL
Qty: 360 CAPSULE | Refills: 1 | Status: SHIPPED | OUTPATIENT
Start: 2024-10-03

## 2024-10-03 RX ORDER — GABAPENTIN 300 MG/1
CAPSULE ORAL
Qty: 360 CAPSULE | Refills: 1 | Status: SHIPPED | OUTPATIENT
Start: 2024-10-03 | End: 2024-10-03

## 2024-10-03 RX ADMIN — MEDROXYPROGESTERONE ACETATE 150 MG: 150 INJECTION, SUSPENSION INTRAMUSCULAR at 02:10

## 2024-10-03 NOTE — PROGRESS NOTES
"  [facility] OFFICE VISIT NOTE  Hawa Ortiz  [id number]  10/03/2024    Chief Complaint   Patient presents with    Follow-up    Back Pain     Patient states she is still having back pain. Needs refill on Gabapentin.       HPI  Hawa Ortiz is a 49 y.o. female  presenting to [facility] for back pain. Patient was supposed to get Depo-provera injection on 9/26 but missed her appointment. She has not had sexual intercourse in 22 years. Last menstrual period July 12, 2024. Will get POCT urine pregnancy test. Reports history of chronic back pain, helped with Gabapentin 300mg TID. Interested in increasing dose, back pain still present. Reports one wart on R thumb surface. Informed of HPV+ NIL pap smear result from 9/2023, amenable to repeat pap. Denies recent fever, n/v, CP, SOB, or abdominal pain.           Review of System  Pertinent review of system noted in HPI above    Blood pressure 119/80, pulse 65, temperature 98.2 °F (36.8 °C), temperature source Oral, resp. rate 18, height 5' 6" (1.676 m), weight 84.6 kg (186 lb 6.4 oz), SpO2 99%.     Physical Exam:  General: Appears comfortable  HEENT: NC/AT  CVS: Regular rhythm. Normal S1/S2.  Pulmonary: clear to auscultation bilaterally, no wheezing  Abdomen: nondistended, normoactive BS, soft and non-tender.  Genitourinary: no vaginal erosions or atrophy on exam, normal appearing labia majora and minor, speculum exam performed with mild mucous, cervix with no visible lesion or abnormality.   MSK: No obvious deformity or joint swelling, bilateral great toenail with mild distal discoloration non tender to palpation. Midline great toenail raised from nail bed.   Skin: Warm and dry, one wart present on R thumb surface.   Neuro: AAOx3, no focal neurological deficit. CN II-XII grossly intact   Psych: Cooperative    Current Medications:   Current Outpatient Medications   Medication Sig Dispense Refill    albuterol (VENTOLIN HFA) 90 mcg/actuation inhaler Inhale 2 puffs into the lungs " every 6 (six) hours as needed for Wheezing. Rescue 18 g 1    cetirizine (ZYRTEC) 10 MG tablet Take 1 tablet (10 mg total) by mouth nightly as needed for Allergies. 360 tablet 0    ferrous sulfate 325 (65 FE) MG EC tablet Take 1 tablet (325 mg total) by mouth once daily. Switch to every other day IF constipated. 90 tablet 3    fluticasone propionate (FLONASE) 50 mcg/actuation nasal spray 1 spray (50 mcg total) by Each Nostril route 2 (two) times a day. 15.8 mL 12    gabapentin (NEURONTIN) 300 MG capsule Take 300mg in morning, 300mg at afternoon, and 600mg at night 360 capsule 1    LIDOcaine (LIDODERM) 5 % Place 1 patch onto the skin daily as needed. Remove & Discard patch within 12 hours or as directed by MD 30 patch 1    omeprazole (PRILOSEC) 40 MG capsule Take 1 capsule (40 mg total) by mouth once daily. 30 capsule 5    polyethylene glycol (MOVIPREP) 100-7.5-2.691 gram solution Take as directed prior to colonoscopy (Patient not taking: Reported on 9/26/2024) 1 kit 0    rizatriptan (MAXALT) 10 MG tablet Take 1 tablet (10 mg total) by mouth as needed for Migraine. 12 tablet 2    sodium chloride (SALINE MIST) 0.65 % nasal spray 1 spray by Nasal route 2 (two) times a day. 15 mL 12    topiramate (TOPAMAX) 100 MG tablet Take 1 tablet (100 mg total) by mouth 2 (two) times daily. 180 tablet 3     No current facility-administered medications for this visit.     Procedure Note:  Procedure: Cryotherapy  Performed by: Ghislaine Beck MD  Supervised by: Judi Nuno MD  Consent: signed consent obtained after discussion of risks, benefits, and alternative treatments  Lesions: 1x wart on Right lateral thumb surface  Description:  Liquid nitrogen used for cryotherapy. Tip held 1 cm from lesion and sprayed in freeze-thaw cycle.   The patient tolerated the procedure well with no complications.      Assessment:   1. Depo-Provera contraceptive status    2. Pap smear for cervical cancer screening    3. Lumbar pain    4. Viral warts,  unspecified type        Plan:  Menstrual pain and irregular cycles, on Depo Provera  -last Menstrual cycle 7/12/2024, reports history of painful periods  -Has been on Depo-provera injections, previous injection 6/20/24, next dose supposed to be on Sep 5 - Sept 19, missed previous injection  -UPT pregnancy test obtained this visit: negative 10/3/24  -administered Depo provera at this visit, next injection will be between Dec 19, 2024 to Jan 2nd, 2025    Abnormal Pap smear:  -Normal pap smear NIL 1/2020, Pap smear in 9/2023 with +HPV and NIL, repeat pap smear with HPV testing performed today, will call patient with results.    Back pain:  -Chronic lumbar back pain that has been treated with Gabapentin 300mg TID, patient reports mild improvement but back pain still persists  -Will increase Gabapentin 300mg in morning, 300mg in afternoon, and 600mg at night  -Counseled on possible increased sedation to increased dose.     Bilateral Great Toenail pain:  -bilateral great toenail with distal elevation from nail bed on distal nails, improving pain over time.   -non tender to palpation, no active wound visible with no drainage or bleeding.   -patient hits toes often, spoke about potential changing to different hard toed shoes to prevent bumping onto toes.   -Recommended filing toenails to keep nails short to prevent further damage and to avoid clipping due to possible splintering due to nail damage    Wart  -One wart located on Right thumb surface  -Procedure note as noted above  -return to clinic in 3 months for recheck along with depo provera shot.    Health Maintenance:  -Colonoscopy performed on 8/22/24, normal study return in 10 years.  -Mammo Digital 8/12/2024, BI-Rads 1        Orders Placed This Encounter    POCT urine pregnancy    Liquid-Based Pap Smear, Screening    medroxyPROGESTERone (DEPO-PROVERA) injection 150 mg    gabapentin (NEURONTIN) 300 MG capsule       Return to clinic in 1 months for pain med management  and wart f/u, or sooner if needed.     Ghislaine Beck  [facility] Resident HO-1

## 2024-10-09 ENCOUNTER — TELEPHONE (OUTPATIENT)
Dept: FAMILY MEDICINE | Facility: CLINIC | Age: 49
End: 2024-10-09
Payer: MEDICAID

## 2024-10-09 DIAGNOSIS — R87.612 LOW GRADE SQUAMOUS INTRAEPITHELIAL LESION ON CYTOLOGIC SMEAR OF CERVIX (LGSIL): Primary | ICD-10-CM

## 2024-10-09 NOTE — TELEPHONE ENCOUNTER
Called patient, identity verified with , explained recent results of high risk HPV+, LGSIL results. Due to previous Pap smear 1 year prior with High risk HPV+ with NIL, recommended patient get referred to Gyn clinic for workup with possible colposcopy with potential biopsy. Procedure explained to patient, questions and concerns answered. Emphasized to patient that this does not indicate that she has definite malignancy, will need more workup. Referral made to [facility]. Patient amenable for referral.

## 2025-02-24 ENCOUNTER — TELEPHONE (OUTPATIENT)
Dept: FAMILY MEDICINE | Facility: CLINIC | Age: 50
End: 2025-02-24
Payer: MEDICAID

## 2025-03-03 ENCOUNTER — OFFICE VISIT (OUTPATIENT)
Dept: FAMILY MEDICINE | Facility: CLINIC | Age: 50
End: 2025-03-03
Payer: MEDICAID

## 2025-03-03 VITALS
TEMPERATURE: 98 F | HEART RATE: 84 BPM | BODY MASS INDEX: 29.51 KG/M2 | HEIGHT: 66 IN | OXYGEN SATURATION: 98 % | WEIGHT: 183.63 LBS | DIASTOLIC BLOOD PRESSURE: 80 MMHG | SYSTOLIC BLOOD PRESSURE: 119 MMHG

## 2025-03-03 DIAGNOSIS — R87.612 LOW GRADE SQUAMOUS INTRAEPITHELIAL LESION ON CYTOLOGIC SMEAR OF CERVIX (LGSIL): ICD-10-CM

## 2025-03-03 DIAGNOSIS — N92.1 MENORRHAGIA WITH IRREGULAR CYCLE: ICD-10-CM

## 2025-03-03 DIAGNOSIS — Z30.42 DEPO-PROVERA CONTRACEPTIVE STATUS: Primary | ICD-10-CM

## 2025-03-03 LAB
ALBUMIN SERPL-MCNC: 4.3 G/DL (ref 3.5–5)
ALBUMIN/GLOB SERPL: 1 RATIO (ref 1.1–2)
ALP SERPL-CCNC: 63 UNIT/L (ref 40–150)
ALT SERPL-CCNC: 25 UNIT/L (ref 0–55)
ANION GAP SERPL CALC-SCNC: 10 MEQ/L
AST SERPL-CCNC: 26 UNIT/L (ref 5–34)
B-HCG UR QL: NEGATIVE
BASOPHILS # BLD AUTO: 0.05 X10(3)/MCL
BASOPHILS NFR BLD AUTO: 1 %
BILIRUB SERPL-MCNC: 0.3 MG/DL
BUN SERPL-MCNC: 6.4 MG/DL (ref 9.8–20.1)
CALCIUM SERPL-MCNC: 9.4 MG/DL (ref 8.4–10.2)
CHLORIDE SERPL-SCNC: 105 MMOL/L (ref 98–107)
CHOLEST SERPL-MCNC: 161 MG/DL
CHOLEST/HDLC SERPL: 2 {RATIO} (ref 0–5)
CO2 SERPL-SCNC: 23 MMOL/L (ref 22–29)
CREAT SERPL-MCNC: 0.84 MG/DL (ref 0.55–1.02)
CREAT/UREA NIT SERPL: 8
CTP QC/QA: YES
EOSINOPHIL # BLD AUTO: 0.08 X10(3)/MCL (ref 0–0.9)
EOSINOPHIL NFR BLD AUTO: 1.6 %
ERYTHROCYTE [DISTWIDTH] IN BLOOD BY AUTOMATED COUNT: 12.8 % (ref 11.5–17)
GFR SERPLBLD CREATININE-BSD FMLA CKD-EPI: >60 ML/MIN/1.73/M2
GLOBULIN SER-MCNC: 4.1 GM/DL (ref 2.4–3.5)
GLUCOSE SERPL-MCNC: 86 MG/DL (ref 74–100)
HCT VFR BLD AUTO: 42.6 % (ref 37–47)
HDLC SERPL-MCNC: 73 MG/DL (ref 35–60)
HGB BLD-MCNC: 13.7 G/DL (ref 12–16)
IMM GRANULOCYTES # BLD AUTO: 0.01 X10(3)/MCL (ref 0–0.04)
IMM GRANULOCYTES NFR BLD AUTO: 0.2 %
LDLC SERPL CALC-MCNC: 70 MG/DL (ref 50–140)
LYMPHOCYTES # BLD AUTO: 2.03 X10(3)/MCL (ref 0.6–4.6)
LYMPHOCYTES NFR BLD AUTO: 40.9 %
MCH RBC QN AUTO: 28.8 PG (ref 27–31)
MCHC RBC AUTO-ENTMCNC: 32.2 G/DL (ref 33–36)
MCV RBC AUTO: 89.5 FL (ref 80–94)
MONOCYTES # BLD AUTO: 0.41 X10(3)/MCL (ref 0.1–1.3)
MONOCYTES NFR BLD AUTO: 8.3 %
NEUTROPHILS # BLD AUTO: 2.38 X10(3)/MCL (ref 2.1–9.2)
NEUTROPHILS NFR BLD AUTO: 48 %
NRBC BLD AUTO-RTO: 0 %
PLATELET # BLD AUTO: 274 X10(3)/MCL (ref 130–400)
PMV BLD AUTO: 10.8 FL (ref 7.4–10.4)
POTASSIUM SERPL-SCNC: 3.9 MMOL/L (ref 3.5–5.1)
PROT SERPL-MCNC: 8.4 GM/DL (ref 6.4–8.3)
RBC # BLD AUTO: 4.76 X10(6)/MCL (ref 4.2–5.4)
SODIUM SERPL-SCNC: 138 MMOL/L (ref 136–145)
TRIGL SERPL-MCNC: 90 MG/DL (ref 37–140)
TSH SERPL-ACNC: 2.71 UIU/ML (ref 0.35–4.94)
VLDLC SERPL CALC-MCNC: 18 MG/DL
WBC # BLD AUTO: 4.96 X10(3)/MCL (ref 4.5–11.5)

## 2025-03-03 PROCEDURE — 85025 COMPLETE CBC W/AUTO DIFF WBC: CPT

## 2025-03-03 PROCEDURE — 80061 LIPID PANEL: CPT

## 2025-03-03 PROCEDURE — 96372 THER/PROPH/DIAG INJ SC/IM: CPT | Mod: PBBFAC

## 2025-03-03 PROCEDURE — 99214 OFFICE O/P EST MOD 30 MIN: CPT | Mod: PBBFAC

## 2025-03-03 PROCEDURE — 84443 ASSAY THYROID STIM HORMONE: CPT

## 2025-03-03 PROCEDURE — 80053 COMPREHEN METABOLIC PANEL: CPT

## 2025-03-03 PROCEDURE — 36415 COLL VENOUS BLD VENIPUNCTURE: CPT

## 2025-03-03 RX ORDER — MEDROXYPROGESTERONE ACETATE 150 MG/ML
150 INJECTION, SUSPENSION INTRAMUSCULAR
Status: COMPLETED | OUTPATIENT
Start: 2025-03-03 | End: 2025-03-03

## 2025-03-03 RX ADMIN — MEDROXYPROGESTERONE ACETATE 150 MG: 150 INJECTION, SUSPENSION INTRAMUSCULAR at 04:03

## 2025-03-03 NOTE — PROGRESS NOTES
[facility] OFFICE VISIT NOTE  Hawa Ortiz  [id number]  2025    Chief Complaint   Patient presents with    Follow-up     Patient said she missed last appointment for DEPO. C/O headaches at night time.        HPI  Hawa Ortiz is a 50 y.o. female presenting to [facility] for depo provera injection and question concerning colposcopy. Patient has missed previous visit in January for depo. Has not had sexual intercourse in 22 years. LMP 2025. POCT pregnancy test negative. Has history of LSIL and High risk HPV positive, missed previous colposcopy appointment due to fear of results. Counseled on importance of getting colposcopy, patient rescheduled to May 2025 and is willing to go. Denies fever, n/v, CP, SOB, or abdominal pain.     LMP 25, irregular cycles, heavy bleeding dime sized clots, bleeds 2-3 weeks when not on Depo, 12 pads/day of bleeding. With depo-provera, patient bleeds for 2 weeks with decreased amount of bleeding. Menstruation start at age 11. , .       Review of System  Pertinent review of system noted in HPI above    Current Medications:   Current Outpatient Medications   Medication Instructions    albuterol (VENTOLIN HFA) 90 mcg/actuation inhaler 2 puffs, Inhalation, Every 6 hours PRN, Rescue    cetirizine (ZYRTEC) 10 mg, Oral, Nightly PRN    ferrous sulfate 325 mg, Oral, Daily, Switch to every other day IF constipated.    fluticasone propionate (FLONASE) 50 mcg, Each Nostril, 2 times daily    gabapentin (NEURONTIN) 300 MG capsule Take 300mg in morning, 300mg at afternoon, and 600mg at night    LIDOcaine (LIDODERM) 5 % 1 patch, Transdermal, Daily PRN, Remove & Discard patch within 12 hours or as directed by MD    omeprazole (PRILOSEC) 40 mg, Oral, Daily    polyethylene glycol (MOVIPREP) 100-7.5-2.691 gram solution Take as directed prior to colonoscopy    rizatriptan (MAXALT) 10 mg, Oral, As needed (PRN)    sodium chloride (SALINE MIST) 0.65 % nasal spray 1 spray, Nasal, 2 times  "daily    topiramate (TOPAMAX) 100 mg, Oral, 2 times daily       Blood pressure 119/80, pulse 84, temperature 97.7 °F (36.5 °C), temperature source Oral, height 5' 5.98" (1.676 m), weight 83.3 kg (183 lb 9.6 oz), last menstrual period 02/12/2025, SpO2 98%.     Physical Exam:  General: Appears comfortable  HEENT: NC/AT  CVS: Regular rhythm. Normal S1/S2.  Pulmonary: clear to auscultation bilaterally, no wheezing  Abdomen: nondistended, normoactive BS, soft and non-tender.  MSK: No obvious deformity or joint swelling  Skin: Warm and dry  Neuro: AAOx3, no focal neurological deficit. CN II-XII grossly intact   Psych: Cooperative        Assessment:   1. Depo-Provera contraceptive status    2. Menorrhagia with irregular cycle    3. 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)        Plan:  Depo-Provera Contraceptive status  Menorrhagia with irregular cycles  -LMP 2/12/2025, POCT urine pregnancy negative 3/3/2025  -heavy irregular menstrual bleeding since starting menstrual cycles since 10 yo  -improved bleeding with Depo-provera  -Will administer Depo provera at this visit, will RTC in 3 months for May 19 to June 2 2025  -US transvaginal for workup of heavy menstrual bleeding  -Labs CBC, CMP, TSH, Lipid panel    Hx of LSIL and high risk HPV+  -patient missed multiple colposcopy appointments due to fear of cancer  -counseled on importance of prevention and getting a colposcopy   -patient amenable, rescheduled Colposcopy for 05/02/2025    Orders Placed This Encounter    US Transvaginal Non OB    Comprehensive Metabolic Panel    CBC Auto Differential    Lipid Panel    TSH    CBC with Differential    POCT Urine Pregnancy    medroxyPROGESTERone (DEPO-PROVERA) injection 150 mg       Return to clinic in 3 month for Depo-provera and post colposcopy, or sooner if needed.     Ghislaine Beck  [facility] Resident HO-1    "

## 2025-03-12 ENCOUNTER — HOSPITAL ENCOUNTER (OUTPATIENT)
Dept: RADIOLOGY | Facility: HOSPITAL | Age: 50
Discharge: HOME | End: 2025-03-12
Payer: MEDICAID

## 2025-03-12 DIAGNOSIS — N92.1 MENORRHAGIA WITH IRREGULAR CYCLE: ICD-10-CM

## 2025-03-12 PROCEDURE — 76856 US EXAM PELVIC COMPLETE: CPT | Mod: TC

## 2025-03-31 ENCOUNTER — ANESTHESIA EVENT (OUTPATIENT)
Dept: ENDOSCOPY | Facility: HOSPITAL | Age: 50
End: 2025-03-31
Payer: MEDICAID

## 2025-03-31 RX ORDER — SODIUM CHLORIDE, SODIUM LACTATE, POTASSIUM CHLORIDE, CALCIUM CHLORIDE 600; 310; 30; 20 MG/100ML; MG/100ML; MG/100ML; MG/100ML
INJECTION, SOLUTION INTRAVENOUS CONTINUOUS
OUTPATIENT
Start: 2025-03-31

## 2025-03-31 NOTE — ANESTHESIA PREPROCEDURE EVALUATION
03/31/2025  Hawa Ortiz is a 50 y.o., female for EGD  history of dysphagia    PMH of depression, CLBP, GERD, Migraine Has    UPT status        Active Ambulatory Problems     Diagnosis Date Noted    Allergic rhinitis 06/21/2022    Gastroesophageal reflux disease 06/21/2022    Lumbar pain 09/13/2022    Radiculopathy 09/13/2022    Depression 09/13/2022    Chronic migraine without aura, intractable, with status migrainosus 10/13/2022    Pain 04/05/2023    Class 1 obesity with serious comorbidity and body mass index (BMI) of 32.0 to 32.9 in adult 11/15/2023    Dysphagia 04/23/2024    Diverticulosis large intestine w/o perforation or abscess w/o bleeding 08/22/2024    Screening for colon cancer 09/26/2024     Resolved Ambulatory Problems     Diagnosis Date Noted    No Resolved Ambulatory Problems     Past Medical History:   Diagnosis Date    Arthritis     Bipolar disorder     Borderline diabetes     Migraine        Past Surgical History:   Procedure Laterality Date    CHOLECYSTECTOMY      COLONOSCOPY N/A 8/22/2024    Procedure: COLONOSCOPY;  Surgeon: Modesto Hernandez MD;  Location: [facility] ENDOSCOPY;  Service: Endoscopy;  Laterality: N/A;    EYE SURGERY Bilateral          Lab Results   Component Value Date    WBC 4.96 03/03/2025    HGB 13.7 03/03/2025    HCT 42.6 03/03/2025     03/03/2025    CHOL 161 03/03/2025    TRIG 90 03/03/2025    HDL 73 (H) 03/03/2025    ALT 25 03/03/2025    AST 26 03/03/2025     03/03/2025    K 3.9 03/03/2025     03/03/2025    CREATININE 0.84 03/03/2025    BUN 6.4 (L) 03/03/2025    CO2 23 03/03/2025    TSH 2.713 03/03/2025    HGBA1C 5.4 11/29/2022         Medications Ordered Prior to Encounter[1]    Lab Results   Component Value Date    WBC 4.96 03/03/2025    HGB 13.7 03/03/2025    HCT 42.6 03/03/2025     03/03/2025    CHOL 161 03/03/2025     TRIG 90 03/03/2025    HDL 73 (H) 03/03/2025    ALT 25 03/03/2025    AST 26 03/03/2025     03/03/2025    K 3.9 03/03/2025     03/03/2025    CREATININE 0.84 03/03/2025    BUN 6.4 (L) 03/03/2025    CO2 23 03/03/2025    TSH 2.713 03/03/2025    HGBA1C 5.4 11/29/2022           Pre-op Assessment    I have reviewed the Patient Summary Reports.     I have reviewed the Nursing Notes. I have reviewed the NPO Status.   I have reviewed the Medications.     Review of Systems  Anesthesia Hx:  No problems with previous Anesthesia   History of prior surgery of interest to airway management or planning:          Denies Family Hx of Anesthesia complications.    Denies Personal Hx of Anesthesia complications.                    Hematology/Oncology:  Hematology Normal   Oncology Normal                                   EENT/Dental:  EENT/Dental Normal           Cardiovascular:  Cardiovascular Normal                                              Pulmonary:  Pulmonary Normal                       Renal/:  Renal/ Normal                 Hepatic/GI:  Hepatic/GI Normal                    Musculoskeletal:  Musculoskeletal Normal                Neurological:  Neurology Normal                                      Endocrine:  Endocrine Normal            Dermatological:  Skin Normal    Psych:  Psychiatric Normal                  Physical Exam  General: Well nourished, Cooperative, Alert and Oriented    Airway:  Mallampati: I / I  Mouth Opening: Normal  TM Distance: Normal  Tongue: Normal  Neck ROM: Normal ROM    Dental:  Intact    Anesthesia Plan  Type of Anesthesia, risks & benefits discussed:    Anesthesia Type: Gen Natural Airway  Intra-op Monitoring Plan: Standard ASA Monitors  Post Op Pain Control Plan: IV/PO Opioids PRN  (medical reason for not using multimodal pain management)  Induction:  IV  Informed Consent: Informed consent signed with the Patient and all parties understand the risks and agree with anesthesia plan.  All  questions answered. Patient consented to blood products? No  ASA Score: 2  Day of Surgery Review of History & Physical: H&P Update referred to the surgeon/provider.    Ready For Surgery From Anesthesia Perspective.   .             [1]  No current facility-administered medications on file prior to encounter.     Current Outpatient Medications on File Prior to Encounter   Medication Sig Dispense Refill    albuterol (VENTOLIN HFA) 90 mcg/actuation inhaler Inhale 2 puffs into the lungs every 6 (six) hours as needed for Wheezing. Rescue 18 g 1    cetirizine (ZYRTEC) 10 MG tablet Take 1 tablet (10 mg total) by mouth nightly as needed for Allergies. 360 tablet 0    ferrous sulfate 325 (65 FE) MG EC tablet Take 1 tablet (325 mg total) by mouth once daily. Switch to every other day IF constipated. 90 tablet 3    fluticasone propionate (FLONASE) 50 mcg/actuation nasal spray 1 spray (50 mcg total) by Each Nostril route 2 (two) times a day. 15.8 mL 12    LIDOcaine (LIDODERM) 5 % Place 1 patch onto the skin daily as needed. Remove & Discard patch within 12 hours or as directed by MD 30 patch 1    sodium chloride (SALINE MIST) 0.65 % nasal spray 1 spray by Nasal route 2 (two) times a day. 15 mL 12    topiramate (TOPAMAX) 100 MG tablet Take 1 tablet (100 mg total) by mouth 2 (two) times daily. 180 tablet 3    polyethylene glycol (MOVIPREP) 100-7.5-2.691 gram solution Take as directed prior to colonoscopy (Patient not taking: Reported on 9/26/2024) 1 kit 0    rizatriptan (MAXALT) 10 MG tablet Take 1 tablet (10 mg total) by mouth as needed for Migraine. (Patient not taking: Reported on 3/21/2025) 12 tablet 2

## 2025-04-02 ENCOUNTER — HOSPITAL ENCOUNTER (OUTPATIENT)
Facility: HOSPITAL | Age: 50
Discharge: HOME | End: 2025-04-02
Attending: INTERNAL MEDICINE | Admitting: INTERNAL MEDICINE
Payer: MEDICAID

## 2025-04-02 ENCOUNTER — ANESTHESIA (OUTPATIENT)
Dept: ENDOSCOPY | Facility: HOSPITAL | Age: 50
End: 2025-04-02
Payer: MEDICAID

## 2025-04-02 VITALS
SYSTOLIC BLOOD PRESSURE: 117 MMHG | RESPIRATION RATE: 20 BRPM | HEIGHT: 60 IN | OXYGEN SATURATION: 100 % | DIASTOLIC BLOOD PRESSURE: 48 MMHG | TEMPERATURE: 98 F | WEIGHT: 181.63 LBS | HEART RATE: 62 BPM | BODY MASS INDEX: 35.66 KG/M2

## 2025-04-02 DIAGNOSIS — K21.9 GASTROESOPHAGEAL REFLUX DISEASE, UNSPECIFIED WHETHER ESOPHAGITIS PRESENT: ICD-10-CM

## 2025-04-02 DIAGNOSIS — R13.10 DYSPHAGIA, UNSPECIFIED TYPE: ICD-10-CM

## 2025-04-02 LAB
B-HCG UR QL: NEGATIVE
CTP QC/QA: YES

## 2025-04-02 PROCEDURE — 43239 EGD BIOPSY SINGLE/MULTIPLE: CPT | Performed by: INTERNAL MEDICINE

## 2025-04-02 PROCEDURE — 63600175 PHARM REV CODE 636 W HCPCS

## 2025-04-02 PROCEDURE — 81025 URINE PREGNANCY TEST: CPT | Performed by: SPECIALIST

## 2025-04-02 PROCEDURE — 88305 TISSUE EXAM BY PATHOLOGIST: CPT | Mod: TC | Performed by: INTERNAL MEDICINE

## 2025-04-02 PROCEDURE — 37000008 HC ANESTHESIA 1ST 15 MINUTES: Performed by: INTERNAL MEDICINE

## 2025-04-02 PROCEDURE — 27201423 OPTIME MED/SURG SUP & DEVICES STERILE SUPPLY: Performed by: INTERNAL MEDICINE

## 2025-04-02 RX ORDER — LIDOCAINE HYDROCHLORIDE 20 MG/ML
INJECTION INTRAVENOUS
Status: DISCONTINUED | OUTPATIENT
Start: 2025-04-02 | End: 2025-04-02

## 2025-04-02 RX ORDER — PROPOFOL 10 MG/ML
INJECTION, EMULSION INTRAVENOUS
Status: DISCONTINUED | OUTPATIENT
Start: 2025-04-02 | End: 2025-04-02

## 2025-04-02 RX ADMIN — PROPOFOL 20 MG: 10 INJECTION, EMULSION INTRAVENOUS at 12:04

## 2025-04-02 RX ADMIN — LIDOCAINE HYDROCHLORIDE 50 MG: 20 INJECTION INTRAVENOUS at 12:04

## 2025-04-02 RX ADMIN — PROPOFOL 30 MG: 10 INJECTION, EMULSION INTRAVENOUS at 12:04

## 2025-04-02 RX ADMIN — PROPOFOL 120 MG: 10 INJECTION, EMULSION INTRAVENOUS at 12:04

## 2025-04-02 NOTE — H&P
EGD History and Physical    Patient Name: Hawa Ortiz  MRN: [id number]  : 1975  Date of Procedure:  2025  Referring Physician: Amina Bethea FNP  Primary Physician: Ghislaine Beck MD  Procedure Physician: Ashley Lockhart MD, MPH     Procedure - EGD  ASA - per anesthesia  Mallampati - per anesthesia  History of Anesthesia problems - no  Family history Anesthesia problems -  no   Plan of anesthesia - General    Diagnosis:  dysphagia  Chief Complaint: Same as above    HPI: Patient is an 50 y.o. female is here for the above.     Ms. Ortiz is a 50-year-old  female with bipolar disorder, migraine headaches here for an Egd.      She reports intermittent dysphagia with solids, mostly meat, all her life.  She describes a sensation of something feeling stuck at the back of her throat.  She denies problems with liquids and reports relief with drinking liquids.  She denies subsequent coughing, choking, or vomiting.  She has frequent acid reflux for which she takes omeprazole 40 mg prn.   She denies appetite changes, unintentional weight loss, odynophagia, early satiety, or abdominal pain.      Bowel habits are described as formed stools once daily without melena, hematochezia.     She had an EGD done several years ago but in unsure of the findings of the procedure.  She underwent colonoscopy 2024 with findings of diverticulosis in the sigmoid colon, descending colon, and otherwise unremarkable exam with no specimens collected and a recommended recall of 10 years.     She denies regular NSAID use or use of blood thinners.  She denies tobacco or alcohol use.  She is a former smoker and reports cessation one year ago.  She denies illicit drug use.  She denies a family history of IBD, colon polyps, or colon cancer.           Last colonoscopy:   Family history: denies  Anticoagulation: none    ROS:  Constitutional: No fevers, chills, No weight loss  CV: No chest pain  Pulm: No cough, No  shortness of breath  GI: see HPI    Medical History:   Past Medical History:   Diagnosis Date    Arthritis     Bipolar disorder     Borderline diabetes     Migraine          Surgical History:   Past Surgical History:   Procedure Laterality Date    CHOLECYSTECTOMY      COLONOSCOPY N/A 8/22/2024    Procedure: COLONOSCOPY;  Surgeon: Modesto Hernandez MD;  Location: [facility] ENDOSCOPY;  Service: Endoscopy;  Laterality: N/A;    EYE SURGERY Bilateral        Family History:   Family History   Problem Relation Name Age of Onset    Heart disease Mother      Heart disease Father      Aneurysm Neg Hx      Cancer Neg Hx      Clotting disorder Neg Hx      Dementia Neg Hx      Diabetes Neg Hx      Fainting Neg Hx      Hyperlipidemia Neg Hx      Kidney disease Neg Hx      Liver disease Neg Hx      Migraines Neg Hx      Neuropathy Neg Hx      Obesity Neg Hx      Parkinsonism Neg Hx      Seizures Neg Hx      Stroke Neg Hx      Tremor Neg Hx     .    Social History:   Social History     Socioeconomic History    Marital status: Single   Tobacco Use    Smoking status: Former     Types: Cigarettes    Smokeless tobacco: Never    Tobacco comments:     Quit about 1 yr ago (2023)   Substance and Sexual Activity    Alcohol use: Not Currently    Drug use: Never    Sexual activity: Not Currently     Social Drivers of Health     Transportation Needs: No Transportation Needs (3/22/2024)    PRAPARE - Transportation     Lack of Transportation (Medical): No     Lack of Transportation (Non-Medical): No       Review of patient's allergies indicates:   Allergen Reactions    Adhesive tape-silicones Hives       Medications:   Prescriptions Prior to Admission[1]      Physical Exam:    Vital Signs: There were no vitals filed for this visit.  There were no vitals taken for this visit.    General:          Well appearing in no acute distress  Lungs: Clear to auscultation bilaterally, respirations unlabored  Heart: Regular rate and rhythm, S1 and S2 normal, no  "obvious murmurs  Abdomen:         Soft, non-tender, bowel sounds normal, no masses, no organomegaly        Labs:  Lab Results   Component Value Date    WBC 4.96 03/03/2025    HGB 13.7 03/03/2025    HCT 42.6 03/03/2025    MCV 89.5 03/03/2025     03/03/2025     No results found for: "INR", "PT", "PTT"  Lab Results   Component Value Date     03/03/2025    K 3.9 03/03/2025    CO2 23 03/03/2025    BUN 6.4 (L) 03/03/2025    CREATININE 0.84 03/03/2025    LABPROT 8.4 (H) 03/03/2025    ALBUMIN 4.3 03/03/2025    BILITOT 0.3 03/03/2025    ALKPHOS 63 03/03/2025    ALT 25 03/03/2025    AST 26 03/03/2025       Assessment and Plan:     History reviewed, vital signs satisfactory, cardiopulmonary status satisfactory.  I have explained the sedation options, risks, benefits, and alternatives of this endoscopic procedure to the patient including but not limited to bleeding, inflammation, infection, perforation, and death.  All questions were answered and the patient consented to proceed with procedure as planned.   The patient is deemed an appropriate candidate for the sedation as planned.      Ashley Lockhart MD, MPH   of Clinical Medicine  Gastroenterology and Hepatology  [facility]    4/2/2025  11:55 AM          [1]   Medications Prior to Admission   Medication Sig Dispense Refill Last Dose/Taking    albuterol (VENTOLIN HFA) 90 mcg/actuation inhaler Inhale 2 puffs into the lungs every 6 (six) hours as needed for Wheezing. Rescue 18 g 1 Taking As Needed    cetirizine (ZYRTEC) 10 MG tablet Take 1 tablet (10 mg total) by mouth nightly as needed for Allergies. 360 tablet 0 Taking As Needed    ferrous sulfate 325 (65 FE) MG EC tablet Take 1 tablet (325 mg total) by mouth once daily. Switch to every other day IF constipated. 90 tablet 3 Taking    fluticasone propionate (FLONASE) 50 mcg/actuation nasal spray 1 spray (50 mcg total) by Each Nostril route 2 (two) times a " day. 15.8 mL 12 Taking    gabapentin (NEURONTIN) 300 MG capsule Take 300mg in morning, 300mg at afternoon, and 600mg at night 360 capsule 1 Taking    LIDOcaine (LIDODERM) 5 % Place 1 patch onto the skin daily as needed. Remove & Discard patch within 12 hours or as directed by MD 30 patch 1 Taking As Needed    sodium chloride (SALINE MIST) 0.65 % nasal spray 1 spray by Nasal route 2 (two) times a day. 15 mL 12 Taking    topiramate (TOPAMAX) 100 MG tablet Take 1 tablet (100 mg total) by mouth 2 (two) times daily. 180 tablet 3 Taking    omeprazole (PRILOSEC) 40 MG capsule Take 1 capsule (40 mg total) by mouth once daily. (Patient not taking: Reported on 3/21/2025) 30 capsule 5 Not Taking    polyethylene glycol (MOVIPREP) 100-7.5-2.691 gram solution Take as directed prior to colonoscopy (Patient not taking: Reported on 9/26/2024) 1 kit 0     rizatriptan (MAXALT) 10 MG tablet Take 1 tablet (10 mg total) by mouth as needed for Migraine. (Patient not taking: Reported on 3/21/2025) 12 tablet 2 Not Taking

## 2025-04-02 NOTE — ANESTHESIA POSTPROCEDURE EVALUATION
Anesthesia Post Evaluation    Patient: Hawa Ortiz    Procedure(s) Performed: Procedure(s) (LRB):  EGD, WITH CLOSED BIOPSY (N/A)    Final Anesthesia Type: MAC      Patient location during evaluation: GI PACU  Patient participation: Yes- Able to Participate  Level of consciousness: awake and alert  Post-procedure vital signs: reviewed and stable  Pain management: adequate  Airway patency: patent    PONV status at discharge: No PONV  Anesthetic complications: no      Cardiovascular status: hemodynamically stable  Respiratory status: unassisted and room air  Hydration status: euvolemic  Follow-up not needed.              Vitals Value Taken Time   /79 04/02/25 12:09   Temp 36.7 °C (98.1 °F) 04/02/25 12:09   Pulse 63 04/02/25 12:09   Resp 17 04/02/25 12:09   SpO2 99 % 04/02/25 12:31         No case tracking events are documented in the log.      Pain/Ezequiel Score: Ezequiel Score: 9 (4/2/2025 12:44 PM)

## 2025-04-02 NOTE — TRANSFER OF CARE
Anesthesia Transfer of Care Note    Patient: Hawa Ortiz    Procedure(s) Performed: Procedure(s) (LRB):  EGD, WITH CLOSED BIOPSY (N/A)    Patient location: GI    Anesthesia Type: MAC    Transport from OR: Transported from OR on room air with adequate spontaneous ventilation    Post pain: adequate analgesia    Post assessment: no apparent anesthetic complications    Post vital signs: stable    Level of consciousness: awake    Nausea/Vomiting: no nausea/vomiting    Complications: none    Transfer of care protocol was followed      Last vitals: Visit Vitals  /79 (BP Location: Right arm, Patient Position: Lying)   Pulse 63   Temp 36.7 °C (98.1 °F) (Oral)   Resp 17   Ht 5' (1.524 m)   Wt 82.4 kg (181 lb 9.6 oz)   SpO2 99%   Breastfeeding No   BMI 35.47 kg/m²

## 2025-04-02 NOTE — [employer] PATIENT INSTRUCTIONS
Discharge Summary/Instructions after an Endoscopic Procedure  Patient Name: Hawa Ortiz  Patient MRN: [id number]  Patient YOB: 1975 Wednesday, April 2, 2025  Ashley Lockhart MD  Dear patient,  As a result of recent federal legislation (The Federal Cures Act), you may   receive lab or pathology results from your procedure in your [facility]   account before your physician is able to contact you. Your physician or   their representative will relay the results to you with their   recommendations at their soonest availability.  Thank you,  RESTRICTIONS:  During your procedure today, you received medications for sedation.  These   medications may affect your judgment, balance and coordination.  Therefore,   for 24 hours, you have the following restrictions:   - DO NOT drive a car, operate machinery, make legal/financial decisions,   sign important papers or drink alcohol.    ACTIVITY:  Today: no heavy lifting, straining or running due to procedural   sedation/anesthesia.  The following day: return to full activity including work.  DIET:  Eat and drink normally unless instructed otherwise.     TREATMENT FOR COMMON SIDE EFFECTS:  - Mild abdominal pain, nausea, belching, bloating or excessive gas:  rest,   eat lightly and use a heating pad.  - Sore Throat: treat with throat lozenges and/or gargle with warm salt   water.  - Because air was used during the procedure, expelling large amounts of air   from your rectum or belching is normal.  - If a bowel prep was taken, you may not have a bowel movement for 1-3 days.    This is normal.  SYMPTOMS TO WATCH FOR AND REPORT TO YOUR PHYSICIAN:  1. Abdominal pain or bloating, other than gas cramps.  2. Chest pain.  3. Back pain.  4. Signs of infection such as: chills or fever occurring within 24 hours   after the procedure.  5. Rectal bleeding, which would show as bright red, maroon, or black stools.   (A tablespoon of blood from the rectum is not serious, especially  if   hemorrhoids are present.)  6. Vomiting.  7. Weakness or dizziness.  GO DIRECTLY TO THE NEAREST EMERGENCY ROOM IF YOU HAVE ANY OF THE FOLLOWING:      Difficulty breathing              Chills and/or fever over 101 F   Persistent vomiting and/or vomiting blood   Severe abdominal pain   Severe chest pain   Black, tarry stools   Bleeding- more than one tablespoon   Any other symptom or condition that you feel may need urgent attention  Your doctor recommends these additional instructions:  If any biopsies were taken, your doctors clinic will contact you in 1 to 2   weeks with any results.  Recommendations:  - Patient has a contact number available for emergencies.  The signs and   symptoms of potential delayed complications were discussed with the   patient.  Return to normal activities tomorrow.  Written discharge   instructions were provided to the patient.   - Discharge patient to home.   - Resume previous diet.   - Continue present medications - take omeprazole 40 mg every day.   - Await pathology results.  Impressions:  - Esophageal mucosal changes.   - 1 cm hiatal hernia.   - Erythematous mucosa in the stomach.  Biopsied.   - Normal examined duodenum.   - Biopsies were taken with a cold forceps for evaluation of eosinophilic   esophagitis.  For questions, problems or results please call your physician - Ashley Lockhart MD at Work:  [phone], Work:  [phone].  [facility] , EMERGENCY ROOM PHONE NUMBER: [phone]  IF A COMPLICATION OR EMERGENCY SITUATION ARISES AND YOU ARE UNABLE TO REACH   YOUR PHYSICIAN - GO DIRECTLY TO THE EMERGENCY ROOM.  Ashley Lockhart MD  4/2/2025 12:56:44 PM  This report has been verified and signed electronically.  Dear patient,  As a result of recent federal legislation (The Federal Cures Act), you may   receive lab or pathology results from your procedure in your [facility]   account before your physician is able to contact you. Your  physician or   their representative will relay the results to you with their   recommendations at their soonest availability.  Thank you,  PROVATION

## 2025-04-07 ENCOUNTER — RESULTS FOLLOW-UP (OUTPATIENT)
Dept: GASTROENTEROLOGY | Facility: HOSPITAL | Age: 50
End: 2025-04-07

## 2025-04-07 LAB
ESTROGEN SERPL-MCNC: NORMAL PG/ML
INSULIN SERPL-ACNC: NORMAL U[IU]/ML
LAB AP CLINICAL INFORMATION: NORMAL
LAB AP GROSS DESCRIPTION: NORMAL
LAB AP REPORT FOOTNOTES: NORMAL
T3RU NFR SERPL: NORMAL %

## 2025-04-21 ENCOUNTER — HOSPITAL ENCOUNTER (OUTPATIENT)
Dept: RADIOLOGY | Facility: HOSPITAL | Age: 50
Discharge: HOME | End: 2025-04-21
Attending: NURSE PRACTITIONER
Payer: MEDICAID

## 2025-04-21 ENCOUNTER — OFFICE VISIT (OUTPATIENT)
Dept: GASTROENTEROLOGY | Facility: CLINIC | Age: 50
End: 2025-04-21
Payer: MEDICAID

## 2025-04-21 VITALS
HEART RATE: 76 BPM | HEIGHT: 60 IN | OXYGEN SATURATION: 100 % | DIASTOLIC BLOOD PRESSURE: 80 MMHG | TEMPERATURE: 98 F | SYSTOLIC BLOOD PRESSURE: 129 MMHG | RESPIRATION RATE: 16 BRPM | WEIGHT: 181.38 LBS | BODY MASS INDEX: 35.61 KG/M2

## 2025-04-21 DIAGNOSIS — R10.84 GENERALIZED ABDOMINAL PAIN: ICD-10-CM

## 2025-04-21 DIAGNOSIS — Z12.11 SCREENING FOR COLON CANCER: ICD-10-CM

## 2025-04-21 DIAGNOSIS — R13.10 DYSPHAGIA, UNSPECIFIED TYPE: Primary | ICD-10-CM

## 2025-04-21 DIAGNOSIS — K44.9 HIATAL HERNIA: ICD-10-CM

## 2025-04-21 DIAGNOSIS — K29.50 CHRONIC GASTRITIS WITHOUT BLEEDING, UNSPECIFIED GASTRITIS TYPE: ICD-10-CM

## 2025-04-21 PROBLEM — K21.9 GASTROESOPHAGEAL REFLUX DISEASE: Status: RESOLVED | Noted: 2022-06-21 | Resolved: 2025-04-21

## 2025-04-21 PROCEDURE — 99214 OFFICE O/P EST MOD 30 MIN: CPT | Mod: PBBFAC | Performed by: NURSE PRACTITIONER

## 2025-04-21 PROCEDURE — 3074F SYST BP LT 130 MM HG: CPT | Mod: CPTII,,, | Performed by: NURSE PRACTITIONER

## 2025-04-21 PROCEDURE — 3008F BODY MASS INDEX DOCD: CPT | Mod: CPTII,,, | Performed by: NURSE PRACTITIONER

## 2025-04-21 PROCEDURE — 99214 OFFICE O/P EST MOD 30 MIN: CPT | Mod: S$PBB,,, | Performed by: NURSE PRACTITIONER

## 2025-04-21 PROCEDURE — 74019 RADEX ABDOMEN 2 VIEWS: CPT | Mod: TC

## 2025-04-21 PROCEDURE — 1159F MED LIST DOCD IN RCRD: CPT | Mod: CPTII,,, | Performed by: NURSE PRACTITIONER

## 2025-04-21 PROCEDURE — 1160F RVW MEDS BY RX/DR IN RCRD: CPT | Mod: CPTII,,, | Performed by: NURSE PRACTITIONER

## 2025-04-21 PROCEDURE — 3079F DIAST BP 80-89 MM HG: CPT | Mod: CPTII,,, | Performed by: NURSE PRACTITIONER

## 2025-04-21 RX ORDER — FAMOTIDINE 20 MG/1
20 TABLET, FILM COATED ORAL NIGHTLY PRN
Qty: 30 TABLET | Refills: 11 | Status: SHIPPED | OUTPATIENT
Start: 2025-04-21

## 2025-04-21 NOTE — PROGRESS NOTES
Subjective:       Patient ID: Hawa Ortiz is a 50 y.o. female.    Chief Complaint: Dysphagia, unspecified type (Sometimes difficult with food and liquids, reports sore throat and frequent bloody nose.) and Abdominal Pain (Daily after meals. )    This 50-year-old  female with thyroid nodules, arthritis, bipolar disorder, migraine headaches, cholecystectomy presents unaccompanied for a follow-up visit.  She was referred for GERD and seen 2024.  She reported intermittent dysphagia with solids, mostly meat and bread, and medications for the past several years.  She described a sensation of something feeling stuck at the back of her throat.  She denied problems with liquids and reported relief with drinking liquids.  She denied subsequent coughing, choking, or vomiting.  She had frequent acid reflux for which she took omeprazole 20 mg daily with some relief noted.  She reported awakening in the middle of the night with coughing, choking, and reflux at times.  She denied appetite changes, unintentional weight loss, fever, chills, nausea, vomiting, hematemesis, odynophagia, early satiety, or abdominal pain.  Bowel habits were described as formed stools once daily without melena, hematochezia, fecal urgency, fecal incontinence, or pain with defecation.    She underwent EGD 2025 with findings of esophageal mucosal changes. 1 cm hiatal hernia. Erythematous mucosa in the stomach. Biopsied. Normal examined duodenum. Biopsies were taken with a cold forceps for evaluation of eosinophilic esophagitis.     Pathology revealed the followin. Distal esophagus biopsy:  - Squamous esophageal mucosa with no significant histopathologic abnormality.     2. Proximal esophagus biopsy:  - Squamous esophageal mucosa with no significant histopathologic abnormality.     3. Gastric biopsy rule out H.pylori:  - Moderate chronic gastritis.  - Diff Quik stain is negative for Helicobacter organisms.  - Negative  for dysplasia.     Today, she presents for a follow-up visit.  She reports taking omeprazole 40 mg daily in the morning.  She has intermittent mid to lower abdominal pain described as sharp, crampy, and nonradiating.  Symptoms are worsened with eating but she is unable to identify specific food triggers.  She denies specific relieving factors.  Her appetite is fair and her weight is stable.  She has occasional nausea without vomiting.  Symptoms of dysphagia remain unchanged as described at time of last office visit.  Symptoms are worse with solid foods, mostly meat and bread.  She denies problems with liquids or medications and reports relief with drinking liquids.  She denies fever, chills, hematemesis, odynophagia, acid reflux, pyrosis, early satiety, belching, or bloating.  She denies urinary complaints.  Bowel habits are described as formed stools once daily without melena, hematochezia, fecal urgency, fecal incontinence, or pain with defecation.     She underwent colonoscopy August 22, 2024 with findings of diverticulosis in the sigmoid colon, descending colon, and otherwise unremarkable exam with no specimens collected and a recommended recall of 10 years.     She denies regular NSAID use or use of blood thinners.  She denies tobacco or alcohol use.  She is a former smoker and reports cessation 1-2 years ago.  She denies illicit drug use.  She denies a family history of IBD, colon polyps, or colon cancer.      Review of patient's allergies indicates:   Allergen Reactions    Adhesive tape-silicones Hives     Past Medical History:   Diagnosis Date    Arthritis     Bipolar disorder     Borderline diabetes     Migraine      Past Surgical History:   Procedure Laterality Date    CHOLECYSTECTOMY      COLONOSCOPY N/A 8/22/2024    Procedure: COLONOSCOPY;  Surgeon: Modesto Hernandez MD;  Location: [facility] ENDOSCOPY;  Service: Endoscopy;  Laterality: N/A;    EGD, WITH CLOSED BIOPSY N/A 4/2/2025    Procedure: EGD, WITH  CLOSED BIOPSY;  Surgeon: Ashley Lockhart MD;  Location: [facility] ENDOSCOPY;  Service: Gastroenterology;  Laterality: N/A;    EYE SURGERY Bilateral      Family History:   family history includes Heart disease in her father and mother.    Social History:    reports that she has quit smoking. Her smoking use included cigarettes. She has never used smokeless tobacco. She reports that she does not currently use alcohol. She reports that she does not use drugs.    Review of Systems  Negative except as noted in the HPI.      Objective:      Physical Exam  Constitutional:       Appearance: Normal appearance.   HENT:      Head: Normocephalic.      Mouth/Throat:      Mouth: Mucous membranes are moist.   Eyes:      Extraocular Movements: Extraocular movements intact.      Conjunctiva/sclera: Conjunctivae normal.      Pupils: Pupils are equal, round, and reactive to light.   Cardiovascular:      Rate and Rhythm: Normal rate and regular rhythm.      Pulses: Normal pulses.      Heart sounds: Normal heart sounds.   Pulmonary:      Effort: Pulmonary effort is normal.      Breath sounds: Normal breath sounds.   Abdominal:      General: Bowel sounds are normal.      Palpations: Abdomen is soft.   Musculoskeletal:         General: Normal range of motion.      Cervical back: Normal range of motion and neck supple.   Skin:     General: Skin is warm and dry.   Neurological:      General: No focal deficit present.      Mental Status: She is alert and oriented to person, place, and time.   Psychiatric:         Mood and Affect: Mood normal.         Behavior: Behavior normal.         Thought Content: Thought content normal.         Judgment: Judgment normal.         Home Medications:     Current Outpatient Medications   Medication Sig    albuterol (VENTOLIN HFA) 90 mcg/actuation inhaler Inhale 2 puffs into the lungs every 6 (six) hours as needed for Wheezing. Rescue    cetirizine (ZYRTEC) 10 MG tablet Take 1 tablet (10 mg total) by mouth  nightly as needed for Allergies.    ferrous sulfate 325 (65 FE) MG EC tablet Take 1 tablet (325 mg total) by mouth once daily. Switch to every other day IF constipated.    fluticasone propionate (FLONASE) 50 mcg/actuation nasal spray 1 spray (50 mcg total) by Each Nostril route 2 (two) times a day.    gabapentin (NEURONTIN) 300 MG capsule Take 300mg in morning, 300mg at afternoon, and 600mg at night    LIDOcaine (LIDODERM) 5 % Place 1 patch onto the skin daily as needed. Remove & Discard patch within 12 hours or as directed by MD    omeprazole (PRILOSEC) 40 MG capsule Take 1 capsule (40 mg total) by mouth once daily.    sodium chloride (SALINE MIST) 0.65 % nasal spray 1 spray by Nasal route 2 (two) times a day.    topiramate (TOPAMAX) 100 MG tablet Take 1 tablet (100 mg total) by mouth 2 (two) times daily.    famotidine (PEPCID) 20 MG tablet Take 1 tablet (20 mg total) by mouth nightly as needed for Heartburn.    rizatriptan (MAXALT) 10 MG tablet Take 1 tablet (10 mg total) by mouth as needed for Migraine. (Patient not taking: Reported on 3/21/2025)     No current facility-administered medications for this visit.     Laboratory Results:     Recent Results (from the past 12 weeks)   POCT Urine Pregnancy    Collection Time: 03/03/25  3:36 PM   Result Value Ref Range    POC Preg Test, Ur Negative Negative     Acceptable Yes    TSH    Collection Time: 03/03/25  4:25 PM   Result Value Ref Range    TSH 2.713 0.350 - 4.940 uIU/mL   Lipid Panel    Collection Time: 03/03/25  4:25 PM   Result Value Ref Range    Cholesterol Total 161 <=200 mg/dL    HDL Cholesterol 73 (H) 35 - 60 mg/dL    Triglyceride 90 37 - 140 mg/dL    Cholesterol/HDL Ratio 2 0 - 5    Very Low Density Lipoprotein 18     LDL Cholesterol 70.00 50.00 - 140.00 mg/dL   Comprehensive Metabolic Panel    Collection Time: 03/03/25  4:25 PM   Result Value Ref Range    Sodium 138 136 - 145 mmol/L    Potassium 3.9 3.5 - 5.1 mmol/L    Chloride 105 98 - 107  mmol/L    CO2 23 22 - 29 mmol/L    Glucose 86 74 - 100 mg/dL    Blood Urea Nitrogen 6.4 (L) 9.8 - 20.1 mg/dL    Creatinine 0.84 0.55 - 1.02 mg/dL    Calcium 9.4 8.4 - 10.2 mg/dL    Protein Total 8.4 (H) 6.4 - 8.3 gm/dL    Albumin 4.3 3.5 - 5.0 g/dL    Globulin 4.1 (H) 2.4 - 3.5 gm/dL    Albumin/Globulin Ratio 1.0 (L) 1.1 - 2.0 ratio    Bilirubin Total 0.3 <=1.5 mg/dL    ALP 63 40 - 150 unit/L    ALT 25 0 - 55 unit/L    AST 26 5 - 34 unit/L    eGFR >60 mL/min/1.73/m2    Anion Gap 10.0 mEq/L    BUN/Creatinine Ratio 8    CBC with Differential    Collection Time: 03/03/25  4:25 PM   Result Value Ref Range    WBC 4.96 4.50 - 11.50 x10(3)/mcL    RBC 4.76 4.20 - 5.40 x10(6)/mcL    Hgb 13.7 12.0 - 16.0 g/dL    Hct 42.6 37.0 - 47.0 %    MCV 89.5 80.0 - 94.0 fL    MCH 28.8 27.0 - 31.0 pg    MCHC 32.2 (L) 33.0 - 36.0 g/dL    RDW 12.8 11.5 - 17.0 %    Platelet 274 130 - 400 x10(3)/mcL    MPV 10.8 (H) 7.4 - 10.4 fL    Neut % 48.0 %    Lymph % 40.9 %    Mono % 8.3 %    Eos % 1.6 %    Basophil % 1.0 %    Imm Grans % 0.2 %    Neut # 2.38 2.1 - 9.2 x10(3)/mcL    Lymph # 2.03 0.6 - 4.6 x10(3)/mcL    Mono # 0.41 0.1 - 1.3 x10(3)/mcL    Eos # 0.08 0 - 0.9 x10(3)/mcL    Baso # 0.05 <=0.2 x10(3)/mcL    Imm Gran # 0.01 0.00 - 0.04 x10(3)/mcL    NRBC% 0.0 %   POCT urine pregnancy    Collection Time: 04/02/25 12:18 PM   Result Value Ref Range    POC Preg Test, Ur Negative Negative     Acceptable Yes    Specimen to Pathology Gastrointestinal tract    Collection Time: 04/02/25 12:32 PM   Result Value Ref Range    Case Report       [facility] Surgical Pathology                            Case: [id number]                                 Authorizing Provider:  Ashley Lockhart MD   Collected:           04/02/2025 12:32 PM          Ordering Location:     [facility] -       Received:            04/02/2025 01:06 PM                                 Endoscopy                                                                   "  Pathologist:           Joana Mendoza MD                                                        Specimens:   1) - Esophagus, DISTAL ESOPHAGUS                                                                    2) - Esophagus, PROXIMAL ESOPHAGUS                                                                  3) - Gastric Biopsy, GASTRIC R/O H PYLORI                                                  Clinical Information       Procedure:  EGD, WITH CLOSED BIOPSY  Pre-op Diagnosis:  R13.10 - Dysphagia, unspecified type [ICD-10-CM]  K21.9 - Gastroesophageal reflux disease, unspecified whether esophagitis present [ICD-10-CM]  Post-op Diagnosis:  R13.10 - Dysphagia, unspecified type [ICD-10-CM]  K21.9 - Gastroesophageal reflux disease, unspecified whether esophagitis present [ICD-10-CM]      Final Diagnosis         1. Distal esophagus biopsy:  - Squamous esophageal mucosa with no significant histopathologic abnormality.    2. Proximal esophagus biopsy:  - Squamous esophageal mucosa with no significant histopathologic abnormality.    3. Gastric biopsy rule out H.pylori:  - Moderate chronic gastritis.  - Diff Quik stain is negative for Helicobacter organisms.  - Negative for dysplasia.          Microscopic Description       A microscopic examination was performed and the diagnosis reflects the findings.          Gross Description       1. Esophagus: DISTAL ESOPHAGUS  Received in formalin are multiple fragments of tan soft tissue ranging from 1 to 2 mm. Entirely submitted in a single cassette.  2. Esophagus: PROXIMAL ESOPHAGUS  Received in formalin are multiple fragments of tan soft tissue ranging from 1 to 2 mm. Entirely submitted in a single cassette.  3. Gastric Biopsy: GASTRIC R/O H PYLORI  Received in formalin are multiple fragments of tan soft tissue ranging from 1 to 3 mm. Entirely submitted in a single cassette.      Report Footnotes       Unless the case is a "gross only" or additional testing only, the final " diagnosis for each specimen is based on a microscopic examination of appropriate tissue sections.    Appropriately stained controls reviewed.       Assessment/Plan:     Problem List Items Addressed This Visit          GI    Dysphagia - Primary    GERD lifestyle modifications  Reflux precautions  Limit NSAID use  She underwent EGD 2025 with findings of esophageal mucosal changes. 1 cm hiatal hernia. Erythematous mucosa in the stomach. Biopsied. Normal examined duodenum. Biopsies were taken with a cold forceps for evaluation of eosinophilic esophagitis.     Pathology revealed the followin. Distal esophagus biopsy:  - Squamous esophageal mucosa with no significant histopathologic abnormality.     2. Proximal esophagus biopsy:  - Squamous esophageal mucosa with no significant histopathologic abnormality.     3. Gastric biopsy rule out H.pylori:  - Moderate chronic gastritis.  - Diff Quik stain is negative for Helicobacter organisms.  - Negative for dysplasia.     Take omeprazole 40 mg daily in the morning and start famotidine 20 mg at bedtime as needed  Esophageal manometry ordered  Call with updates  F/u clinic visit with NP in 6 months         Relevant Medications    famotidine (PEPCID) 20 MG tablet    Other Relevant Orders    Case Request Endoscopy: MOTILITY STUDY, ESOPHAGUS, USING HIGH RESOLUTION MANOMETRY (Completed)    Chronic gastritis without bleeding    See above         Relevant Medications    famotidine (PEPCID) 20 MG tablet    Other Relevant Orders    Case Request Endoscopy: MOTILITY STUDY, ESOPHAGUS, USING HIGH RESOLUTION MANOMETRY (Completed)    Hiatal hernia    See above         Relevant Medications    famotidine (PEPCID) 20 MG tablet    Other Relevant Orders    Case Request Endoscopy: MOTILITY STUDY, ESOPHAGUS, USING HIGH RESOLUTION MANOMETRY (Completed)    Generalized abdominal pain    See above  Abdominal X-ray ordered         Relevant Orders    X-Ray Abdomen Flat And Erect    Screening  for colon cancer    She underwent colonoscopy August 22, 2024 with findings of diverticulosis in the sigmoid colon, descending colon, and otherwise unremarkable exam with no specimens collected and a recommended recall of 10 years.

## 2025-04-21 NOTE — ASSESSMENT & PLAN NOTE
GERD lifestyle modifications  Reflux precautions  Limit NSAID use  She underwent EGD 2025 with findings of esophageal mucosal changes. 1 cm hiatal hernia. Erythematous mucosa in the stomach. Biopsied. Normal examined duodenum. Biopsies were taken with a cold forceps for evaluation of eosinophilic esophagitis.     Pathology revealed the followin. Distal esophagus biopsy:  - Squamous esophageal mucosa with no significant histopathologic abnormality.     2. Proximal esophagus biopsy:  - Squamous esophageal mucosa with no significant histopathologic abnormality.     3. Gastric biopsy rule out H.pylori:  - Moderate chronic gastritis.  - Diff Quik stain is negative for Helicobacter organisms.  - Negative for dysplasia.     Take omeprazole 40 mg daily in the morning and start famotidine 20 mg at bedtime as needed  Esophageal manometry ordered  Call with updates  F/u clinic visit with NP in 6 months

## 2025-04-22 ENCOUNTER — RESULTS FOLLOW-UP (OUTPATIENT)
Dept: GASTROENTEROLOGY | Facility: CLINIC | Age: 50
End: 2025-04-22

## 2025-04-24 VITALS — BODY MASS INDEX: 35.53 KG/M2 | HEIGHT: 60 IN | WEIGHT: 181 LBS

## 2025-04-28 ENCOUNTER — HOSPITAL ENCOUNTER (OUTPATIENT)
Facility: HOSPITAL | Age: 50
Discharge: HOME | End: 2025-04-28
Attending: INTERNAL MEDICINE | Admitting: INTERNAL MEDICINE
Payer: MEDICAID

## 2025-04-28 PROCEDURE — 25000003 PHARM REV CODE 250

## 2025-04-28 PROCEDURE — 91010 ESOPHAGUS MOTILITY STUDY: CPT | Mod: TC | Performed by: INTERNAL MEDICINE

## 2025-04-28 RX ORDER — LIDOCAINE HYDROCHLORIDE 20 MG/ML
SOLUTION OROPHARYNGEAL
Status: COMPLETED
Start: 2025-04-28 | End: 2025-04-28

## 2025-05-12 ENCOUNTER — RESULTS FOLLOW-UP (OUTPATIENT)
Dept: GASTROENTEROLOGY | Facility: CLINIC | Age: 50
End: 2025-05-12

## 2025-05-12 NOTE — PROGRESS NOTES
Please notify manometry results consistent with distal esophageal spasm most likely due to GERD.  Recommend she continue daily PPI treatment as directed.  Thanks

## (undated) DEVICE — FORCEP ALLIGATOR 2.8MM W/NDL

## (undated) DEVICE — MANIFOLD 4 PORT

## (undated) DEVICE — KIT SURGICAL COLON .25 1.1OZ

## (undated) DEVICE — MOUTHPIECE ENDO 60FR

## (undated) DEVICE — MANOSCAN

## (undated) DEVICE — CYLINDER 1000 ML GRADUATED